# Patient Record
Sex: MALE | Race: WHITE | NOT HISPANIC OR LATINO | URBAN - METROPOLITAN AREA
[De-identification: names, ages, dates, MRNs, and addresses within clinical notes are randomized per-mention and may not be internally consistent; named-entity substitution may affect disease eponyms.]

---

## 2017-08-21 ENCOUNTER — COMMUNICATION - HEALTHEAST (OUTPATIENT)
Dept: FAMILY MEDICINE | Facility: CLINIC | Age: 47
End: 2017-08-21

## 2017-08-21 ENCOUNTER — COMMUNICATION - HEALTHEAST (OUTPATIENT)
Dept: TELEHEALTH | Facility: CLINIC | Age: 47
End: 2017-08-21

## 2017-08-21 ENCOUNTER — OFFICE VISIT - HEALTHEAST (OUTPATIENT)
Dept: FAMILY MEDICINE | Facility: CLINIC | Age: 47
End: 2017-08-21

## 2017-08-21 DIAGNOSIS — J02.0 PHARYNGITIS DUE TO STREPTOCOCCUS SPECIES: ICD-10-CM

## 2018-01-02 ENCOUNTER — COMMUNICATION - HEALTHEAST (OUTPATIENT)
Dept: FAMILY MEDICINE | Facility: CLINIC | Age: 48
End: 2018-01-02

## 2018-01-03 ENCOUNTER — OFFICE VISIT - HEALTHEAST (OUTPATIENT)
Dept: FAMILY MEDICINE | Facility: CLINIC | Age: 48
End: 2018-01-03

## 2018-01-03 ENCOUNTER — COMMUNICATION - HEALTHEAST (OUTPATIENT)
Dept: TELEHEALTH | Facility: CLINIC | Age: 48
End: 2018-01-03

## 2018-01-03 DIAGNOSIS — J32.9 BACTERIAL SINUSITIS: ICD-10-CM

## 2018-01-03 DIAGNOSIS — R09.81 SINUS CONGESTION: ICD-10-CM

## 2018-01-03 DIAGNOSIS — M79.644 PAIN OF FINGER OF RIGHT HAND: ICD-10-CM

## 2018-01-03 DIAGNOSIS — B96.89 BACTERIAL SINUSITIS: ICD-10-CM

## 2018-01-03 ASSESSMENT — MIFFLIN-ST. JEOR: SCORE: 1758.91

## 2018-06-25 ENCOUNTER — OFFICE VISIT - HEALTHEAST (OUTPATIENT)
Dept: FAMILY MEDICINE | Facility: CLINIC | Age: 48
End: 2018-06-25

## 2018-06-25 DIAGNOSIS — M54.50 LOW BACK PAIN: ICD-10-CM

## 2018-06-25 DIAGNOSIS — E55.9 VITAMIN D DEFICIENCY: ICD-10-CM

## 2018-06-25 LAB
ANION GAP SERPL CALCULATED.3IONS-SCNC: 9 MMOL/L (ref 5–18)
BUN SERPL-MCNC: 11 MG/DL (ref 8–22)
CALCIUM SERPL-MCNC: 9.4 MG/DL (ref 8.5–10.5)
CHLORIDE BLD-SCNC: 107 MMOL/L (ref 98–107)
CO2 SERPL-SCNC: 27 MMOL/L (ref 22–31)
CREAT SERPL-MCNC: 0.84 MG/DL (ref 0.7–1.3)
GFR SERPL CREATININE-BSD FRML MDRD: >60 ML/MIN/1.73M2
GLUCOSE BLD-MCNC: 91 MG/DL (ref 70–125)
POTASSIUM BLD-SCNC: 3.8 MMOL/L (ref 3.5–5)
SODIUM SERPL-SCNC: 143 MMOL/L (ref 136–145)

## 2018-06-25 ASSESSMENT — MIFFLIN-ST. JEOR: SCORE: 1781.59

## 2018-06-26 LAB — 25(OH)D3 SERPL-MCNC: 24.1 NG/ML (ref 30–80)

## 2018-12-05 ENCOUNTER — OFFICE VISIT - HEALTHEAST (OUTPATIENT)
Dept: FAMILY MEDICINE | Facility: CLINIC | Age: 48
End: 2018-12-05

## 2018-12-05 DIAGNOSIS — J01.00 ACUTE MAXILLARY SINUSITIS, RECURRENCE NOT SPECIFIED: ICD-10-CM

## 2019-10-02 ENCOUNTER — OFFICE VISIT - HEALTHEAST (OUTPATIENT)
Dept: FAMILY MEDICINE | Facility: CLINIC | Age: 49
End: 2019-10-02

## 2019-10-02 DIAGNOSIS — J01.00 ACUTE NON-RECURRENT MAXILLARY SINUSITIS: ICD-10-CM

## 2020-01-31 ENCOUNTER — COMMUNICATION - HEALTHEAST (OUTPATIENT)
Dept: SCHEDULING | Facility: CLINIC | Age: 50
End: 2020-01-31

## 2020-02-01 ENCOUNTER — OFFICE VISIT - HEALTHEAST (OUTPATIENT)
Dept: FAMILY MEDICINE | Facility: CLINIC | Age: 50
End: 2020-02-01

## 2020-02-01 DIAGNOSIS — J20.9 ACUTE BRONCHITIS, UNSPECIFIED ORGANISM: ICD-10-CM

## 2020-02-01 DIAGNOSIS — R06.2 WHEEZING: ICD-10-CM

## 2020-02-06 ENCOUNTER — OFFICE VISIT - HEALTHEAST (OUTPATIENT)
Dept: FAMILY MEDICINE | Facility: CLINIC | Age: 50
End: 2020-02-06

## 2020-02-06 ENCOUNTER — RECORDS - HEALTHEAST (OUTPATIENT)
Dept: GENERAL RADIOLOGY | Facility: CLINIC | Age: 50
End: 2020-02-06

## 2020-02-06 DIAGNOSIS — J18.9 COMMUNITY ACQUIRED PNEUMONIA, UNSPECIFIED LATERALITY: ICD-10-CM

## 2020-02-06 DIAGNOSIS — J06.9 UPPER RESPIRATORY TRACT INFECTION, UNSPECIFIED TYPE: ICD-10-CM

## 2020-02-06 DIAGNOSIS — J06.9 ACUTE UPPER RESPIRATORY INFECTION, UNSPECIFIED: ICD-10-CM

## 2020-02-13 ENCOUNTER — OFFICE VISIT - HEALTHEAST (OUTPATIENT)
Dept: FAMILY MEDICINE | Facility: CLINIC | Age: 50
End: 2020-02-13

## 2020-02-13 DIAGNOSIS — Z23 NEED FOR VACCINATION: ICD-10-CM

## 2020-02-13 DIAGNOSIS — R05.9 COUGH: ICD-10-CM

## 2020-02-13 LAB
FLUAV AG SPEC QL IA: NORMAL
FLUBV AG SPEC QL IA: NORMAL

## 2020-05-12 ENCOUNTER — COMMUNICATION - HEALTHEAST (OUTPATIENT)
Dept: TELEHEALTH | Facility: CLINIC | Age: 50
End: 2020-05-12

## 2020-05-12 ENCOUNTER — OFFICE VISIT - HEALTHEAST (OUTPATIENT)
Dept: FAMILY MEDICINE | Facility: CLINIC | Age: 50
End: 2020-05-12

## 2020-05-12 DIAGNOSIS — J98.01 BRONCHOSPASM: ICD-10-CM

## 2020-05-12 DIAGNOSIS — R06.2 WHEEZING: ICD-10-CM

## 2020-05-12 DIAGNOSIS — R06.09 DYSPNEA ON EXERTION: ICD-10-CM

## 2020-08-24 ENCOUNTER — OFFICE VISIT - HEALTHEAST (OUTPATIENT)
Dept: FAMILY MEDICINE | Facility: CLINIC | Age: 50
End: 2020-08-24

## 2020-08-24 DIAGNOSIS — J30.89 ENVIRONMENTAL AND SEASONAL ALLERGIES: ICD-10-CM

## 2020-08-24 DIAGNOSIS — R06.2 WHEEZING: ICD-10-CM

## 2020-08-24 DIAGNOSIS — J98.01 BRONCHOSPASM: ICD-10-CM

## 2020-09-10 ENCOUNTER — OFFICE VISIT - HEALTHEAST (OUTPATIENT)
Dept: FAMILY MEDICINE | Facility: CLINIC | Age: 50
End: 2020-09-10

## 2020-09-10 DIAGNOSIS — R35.0 BENIGN PROSTATIC HYPERPLASIA WITH URINARY FREQUENCY: ICD-10-CM

## 2020-09-10 DIAGNOSIS — E55.9 VITAMIN D DEFICIENCY: ICD-10-CM

## 2020-09-10 DIAGNOSIS — Z12.5 SCREENING FOR PROSTATE CANCER: ICD-10-CM

## 2020-09-10 DIAGNOSIS — Z13.220 LIPID SCREENING: ICD-10-CM

## 2020-09-10 DIAGNOSIS — Z23 NEEDS FLU SHOT: ICD-10-CM

## 2020-09-10 DIAGNOSIS — Z20.822 SUSPECTED COVID-19 VIRUS INFECTION: ICD-10-CM

## 2020-09-10 DIAGNOSIS — Z12.11 SCREEN FOR COLON CANCER: ICD-10-CM

## 2020-09-10 DIAGNOSIS — Z23 NEED FOR PROPHYLACTIC VACCINATION AGAINST DIPHTHERIA AND TETANUS: ICD-10-CM

## 2020-09-10 DIAGNOSIS — R05.9 COUGH: ICD-10-CM

## 2020-09-10 DIAGNOSIS — Z00.00 ANNUAL PHYSICAL EXAM: ICD-10-CM

## 2020-09-10 DIAGNOSIS — Z13.1 SCREENING FOR DIABETES MELLITUS: ICD-10-CM

## 2020-09-10 DIAGNOSIS — N40.1 BENIGN PROSTATIC HYPERPLASIA WITH URINARY FREQUENCY: ICD-10-CM

## 2020-09-10 LAB
ANION GAP SERPL CALCULATED.3IONS-SCNC: 10 MMOL/L (ref 5–18)
BUN SERPL-MCNC: 8 MG/DL (ref 8–22)
CALCIUM SERPL-MCNC: 9.4 MG/DL (ref 8.5–10.5)
CHLORIDE BLD-SCNC: 104 MMOL/L (ref 98–107)
CHOLEST SERPL-MCNC: 187 MG/DL
CO2 SERPL-SCNC: 26 MMOL/L (ref 22–31)
CREAT SERPL-MCNC: 0.7 MG/DL (ref 0.7–1.3)
FASTING STATUS PATIENT QL REPORTED: YES
GFR SERPL CREATININE-BSD FRML MDRD: >60 ML/MIN/1.73M2
GLUCOSE BLD-MCNC: 95 MG/DL (ref 70–125)
HDLC SERPL-MCNC: 47 MG/DL
LDLC SERPL CALC-MCNC: 108 MG/DL
POTASSIUM BLD-SCNC: 4.3 MMOL/L (ref 3.5–5)
PSA SERPL-MCNC: 1.6 NG/ML (ref 0–3.5)
SODIUM SERPL-SCNC: 140 MMOL/L (ref 136–145)
TRIGL SERPL-MCNC: 158 MG/DL

## 2020-09-10 ASSESSMENT — MIFFLIN-ST. JEOR: SCORE: 1650.05

## 2020-09-11 LAB
25(OH)D3 SERPL-MCNC: 32.6 NG/ML (ref 30–80)
25(OH)D3 SERPL-MCNC: 32.6 NG/ML (ref 30–80)

## 2020-09-12 ENCOUNTER — COMMUNICATION - HEALTHEAST (OUTPATIENT)
Dept: SCHEDULING | Facility: CLINIC | Age: 50
End: 2020-09-12

## 2020-09-21 ENCOUNTER — OFFICE VISIT - HEALTHEAST (OUTPATIENT)
Dept: ALLERGY | Facility: CLINIC | Age: 50
End: 2020-09-21

## 2020-09-21 DIAGNOSIS — J30.1 SEASONAL ALLERGIC RHINITIS DUE TO POLLEN: ICD-10-CM

## 2020-09-21 DIAGNOSIS — J45.20 MILD INTERMITTENT ASTHMA WITHOUT COMPLICATION: ICD-10-CM

## 2020-09-21 DIAGNOSIS — J30.89 ALLERGIC RHINITIS DUE TO AMERICAN HOUSE DUST MITE: ICD-10-CM

## 2020-09-21 RX ORDER — MONTELUKAST SODIUM 10 MG/1
10 TABLET ORAL AT BEDTIME
Qty: 30 TABLET | Refills: 1 | Status: SHIPPED | OUTPATIENT
Start: 2020-09-21 | End: 2022-11-17

## 2020-09-21 ASSESSMENT — MIFFLIN-ST. JEOR: SCORE: 1662.52

## 2020-10-13 ENCOUNTER — COMMUNICATION - HEALTHEAST (OUTPATIENT)
Dept: SCHEDULING | Facility: CLINIC | Age: 50
End: 2020-10-13

## 2020-10-24 ENCOUNTER — OFFICE VISIT - HEALTHEAST (OUTPATIENT)
Dept: FAMILY MEDICINE | Facility: CLINIC | Age: 50
End: 2020-10-24

## 2020-10-24 DIAGNOSIS — J01.10 ACUTE NON-RECURRENT FRONTAL SINUSITIS: ICD-10-CM

## 2020-11-12 ENCOUNTER — OFFICE VISIT - HEALTHEAST (OUTPATIENT)
Dept: FAMILY MEDICINE | Facility: CLINIC | Age: 50
End: 2020-11-12

## 2020-11-12 DIAGNOSIS — J98.01 BRONCHOSPASM: ICD-10-CM

## 2020-11-12 DIAGNOSIS — J32.9 CHRONIC SINUSITIS, UNSPECIFIED LOCATION: ICD-10-CM

## 2020-11-12 DIAGNOSIS — R06.2 WHEEZING: ICD-10-CM

## 2020-11-12 RX ORDER — ALBUTEROL SULFATE 90 UG/1
2 AEROSOL, METERED RESPIRATORY (INHALATION) EVERY 6 HOURS PRN
Qty: 1 EACH | Refills: 0 | Status: SHIPPED | OUTPATIENT
Start: 2020-11-12 | End: 2022-10-16

## 2021-02-18 ENCOUNTER — RECORDS - HEALTHEAST (OUTPATIENT)
Dept: ADMINISTRATIVE | Facility: OTHER | Age: 51
End: 2021-02-18

## 2021-03-13 ENCOUNTER — COMMUNICATION - HEALTHEAST (OUTPATIENT)
Dept: FAMILY MEDICINE | Facility: CLINIC | Age: 51
End: 2021-03-13

## 2021-05-28 ENCOUNTER — RECORDS - HEALTHEAST (OUTPATIENT)
Dept: ADMINISTRATIVE | Facility: CLINIC | Age: 51
End: 2021-05-28

## 2021-05-28 ASSESSMENT — ASTHMA QUESTIONNAIRES: ACT_TOTALSCORE: 11

## 2021-05-31 VITALS — BODY MASS INDEX: 28.31 KG/M2 | WEIGHT: 197.3 LBS

## 2021-05-31 VITALS — WEIGHT: 199 LBS | BODY MASS INDEX: 28.49 KG/M2 | HEIGHT: 70 IN

## 2021-06-01 VITALS — BODY MASS INDEX: 29.2 KG/M2 | HEIGHT: 70 IN | WEIGHT: 204 LBS

## 2021-06-01 NOTE — PROGRESS NOTES
Assessment:  1.  Acute left maxillary sinusitis.    Plan: Discussed options including symptomatic care, but together we decided to go ahead with azithromycin to 50 mg-#6-2 p.o. today then 1 p.o. on days 2 through 5.  Symptomatic care.   normal saline nose spray, Tylenol or ibuprofen as needed for pain, and call or return if not gradually improving and clearing over the next several weeks.    Subjective: 49-year-old male presenting for evaluation of illness of 2 weeks duration that started with a cold syndrome but then he notes that last Friday he developed difficulty with pain in the left cheek area, has been having some off-and-on postnasal drainage that is greenish or brownish, and he had more coughing over the weekend.  Has not really turned around.  No high fever.  No nausea or vomiting.  Patient Active Problem List   Diagnosis     Lumbar Radiculopathy     Vitamin D Deficiency     Low back pain     History reviewed. No pertinent past medical history.  Allergies   Allergen Reactions     Ciprofloxacin Unknown     Current Outpatient Medications on File Prior to Visit   Medication Sig Dispense Refill     IBUPROFEN (ADVIL ORAL) Take by mouth.       [DISCONTINUED] cyclobenzaprine (FLEXERIL) 10 MG tablet Take 1 tablet (10 mg total) by mouth 3 (three) times a day as needed for muscle spasms. 15 tablet 0     [DISCONTINUED] methylPREDNISolone (MEDROL DOSEPACK) 4 mg tablet Take 6 pills first day, 5 on 2nd day, etc tapering by one every day-Follow package directions 21 tablet 0     No current facility-administered medications on file prior to visit.      All other review of systems are currently negative.  He did feel like he might have had some wheezing over the weekend.    Objective:/84   Pulse 78   Temp 98.5  F (36.9  C)   Resp 16   Wt 194 lb 6 oz (88.2 kg)   SpO2 97%   BMI 27.89 kg/m    HEENT examination shows left cheek tenderness.  The external ears and TMs are normal.  There is nasal congestion.  Pharynx  is not remarkable.  Neck supple without adenopathy or thyromegaly.  Lungs clear.  Heart regular rate and rhythm without murmur.  No pedal edema.  He is alert with clear speech.

## 2021-06-02 VITALS — BODY MASS INDEX: 27.77 KG/M2 | WEIGHT: 193.56 LBS

## 2021-06-03 VITALS
TEMPERATURE: 98.5 F | RESPIRATION RATE: 16 BRPM | SYSTOLIC BLOOD PRESSURE: 124 MMHG | OXYGEN SATURATION: 97 % | WEIGHT: 194.38 LBS | DIASTOLIC BLOOD PRESSURE: 84 MMHG | HEART RATE: 78 BPM | BODY MASS INDEX: 27.89 KG/M2

## 2021-06-04 VITALS
TEMPERATURE: 98.5 F | WEIGHT: 186.56 LBS | OXYGEN SATURATION: 95 % | RESPIRATION RATE: 16 BRPM | HEART RATE: 100 BPM | SYSTOLIC BLOOD PRESSURE: 150 MMHG | BODY MASS INDEX: 26.77 KG/M2 | DIASTOLIC BLOOD PRESSURE: 100 MMHG

## 2021-06-04 VITALS
SYSTOLIC BLOOD PRESSURE: 138 MMHG | BODY MASS INDEX: 26.59 KG/M2 | WEIGHT: 185.3 LBS | HEART RATE: 86 BPM | TEMPERATURE: 98.2 F | OXYGEN SATURATION: 95 % | DIASTOLIC BLOOD PRESSURE: 90 MMHG

## 2021-06-04 VITALS
WEIGHT: 190 LBS | SYSTOLIC BLOOD PRESSURE: 132 MMHG | DIASTOLIC BLOOD PRESSURE: 91 MMHG | BODY MASS INDEX: 27.26 KG/M2 | RESPIRATION RATE: 20 BRPM | TEMPERATURE: 98.4 F | HEART RATE: 90 BPM | OXYGEN SATURATION: 93 %

## 2021-06-04 VITALS
HEART RATE: 86 BPM | WEIGHT: 182 LBS | SYSTOLIC BLOOD PRESSURE: 128 MMHG | BODY MASS INDEX: 27.58 KG/M2 | OXYGEN SATURATION: 96 % | DIASTOLIC BLOOD PRESSURE: 88 MMHG | HEIGHT: 68 IN

## 2021-06-05 VITALS — OXYGEN SATURATION: 99 % | BODY MASS INDEX: 27.11 KG/M2 | HEART RATE: 75 BPM | WEIGHT: 183 LBS | HEIGHT: 69 IN

## 2021-06-05 VITALS
OXYGEN SATURATION: 96 % | WEIGHT: 190 LBS | RESPIRATION RATE: 14 BRPM | HEART RATE: 115 BPM | TEMPERATURE: 98.5 F | SYSTOLIC BLOOD PRESSURE: 138 MMHG | DIASTOLIC BLOOD PRESSURE: 93 MMHG | BODY MASS INDEX: 28.47 KG/M2

## 2021-06-05 NOTE — PROGRESS NOTES
Chief Complaint   Patient presents with     Follow-up     Pt went to urgent care and dx with bronchitis. Pt c/o sob, tightness, cough, funny taste, wheezing when laying down.        HPI: Cough congestion and wheezing for 2 months in duration getting worse intensity.  Patient was seen in urgent care on 2/1/2020 and given albuterol nebulizer and discharged.  Since then continues to have wheezing and cough with mild sputum.    ROS: No vomiting diarrhea fever or rashes    SH: Reviewed-see Snapshot for review.     FH: Reviewed-see Snapshot for review.    Meds:  Benja has a current medication list which includes the following prescription(s): albuterol, ibuprofen, and azithromycin.    O:  BP (!) 150/100   Pulse 100   Temp 98.5  F (36.9  C)   Resp 16   Wt 186 lb 9 oz (84.6 kg)   SpO2 95%   BMI 26.77 kg/m    Constitutional:    --Vitals as above  --No acute distress  Eyes-  --Sclera noninjected  --Lids and conjunctiva normal  ENT-  --TMs clear  --Sclera noninjected  --Pharynx not erythematous  Neck-  --Neck supple    Lymph-  --No cervical lymphadenopathy  Lungs-  --wheezing left lower lobe  Heart-  --Regular rate and rhythm  Skin-  --Pink and dry    Chest x-ray-bony architecture is intact, costophrenic angles are sharp, there is focal infiltrates in the left lower lobe and cardiac silhouettes within normal limits    A/P:   1. Upper respiratory tract infection, unspecified type  - XR Chest 2 Views; Future    2. Community acquired pneumonia, unspecified laterality  Patient has community-acquired pneumonia.  Given the fact this is been going on for quite some time and the fact that he has a history of lumbar radiculopathy and continues to be wheezing will treat with Rocephin, Zithromax, increase fluids and rest and return if not improving  - cefTRIAXone injection 1 g (ROCEPHIN)  - azithromycin (ZITHROMAX Z-OLESYA) 250 MG tablet; 2 tabs today then 1 daily x 4 days  Dispense: 6 tablet; Refill: 0

## 2021-06-05 NOTE — TELEPHONE ENCOUNTER
"Pt reports \"shortness of breath, a little bit of phlegm and pain in center of chest with exercise\". Coughing \"a little bit\". Symptoms for \"about a week\". Had similar symptoms for about a month in December. \"A little wheezing and shortness of breath currently\". Possibly a fever over the weekend but not now.     Advised pt to be seen in clinic within four hours. Pt does not want to go to [facility].     Routing message to clinic to f/u with pt on next available appointment.     Reason for Disposition    Wheezing is present    Protocols used: COUGH - ACUTE [id number]      "

## 2021-06-06 NOTE — PROGRESS NOTES
Chief Complaint   Patient presents with     Follow Up     not feeling much better        HPI: 49-year-old male presents today for recheck.  Was seen last week and given Zithromax and Rocephin for perceived pneumonia.  His symptoms have improved somewhat but he has become more fatigued with increased myalgias.  No vomiting or diarrhea noted.  He notes that he took the Zithromax completely.  He has had mild fevers.    ROS: Positive for nonproductive cough.  Negative for rhinorrhea.  Negative for rashes.    SH: Reviewed-see Snapshot for review.     FH: Reviewed-see Snapshot for review.    Meds:  Benja has a current medication list which includes the following prescription(s): albuterol, azithromycin, ibuprofen, and codeine-guaifenesin.    O:  /90   Pulse 86   Temp 98.2  F (36.8  C)   Wt 185 lb 4.8 oz (84.1 kg)   SpO2 95%   BMI 26.59 kg/m    Constitutional:    --Vitals as above  --No acute distress but appears slightly ill  Eyes-  --Sclera noninjected  --Lids and conjunctiva normal  ENT-  --TMs clear  --Sclera noninjected  --Pharynx mildly erythematous  Neck-  --Neck supple    Lymph-  --No cervical lymphadenopathy  Lungs-  --Clear to Auscultation (better than last week)  Heart-  --Regular rate and rhythm  Skin-  --Pink and dry    Lab: Influenza negative    A/P:   1.  URI  This is a puzzling case.  Influenza negative but he has myalgias.  Cannot rule out that he is still having long-term influenza effects.  We gave him broad antibiotics last time so we will hold off on that and have him increase fluids and rest.  Lungs are clear so no indication for repeat x-ray.  - Influenza A/B Rapid Test- Nasal Swab      2.  Cough  He is having substantial cough which is inhibiting him from getting sleep.  Will treat with Robitussin with codeine and warned him about driving.  If not improving would consider further work-up or imaging.  - codeine-guaiFENesin (GUAIFENESIN AC)  mg/5 mL liquid; 2 TSP Q 6H PRN cough-advise  pt not to drive while taking medication.  Dispense: 120 mL; Refill: 0

## 2021-06-08 NOTE — PROGRESS NOTES
SUBJECTIVE: Benja Rosas is a 49 y.o. White or  male who presents today with a complaint of cough and previously shortness of breath which is not as evident today.  I gowned up with PPE to see this patient.  He does have wheezing.  He says he was at the [place] on January 16 and got sick afterward.  He was on the Internet and thought perhaps he had legionnaires disease so he decided to come in to be seen in clinic.  On 2/1 he went to the walk-in clinic and they heard him wheeze and started him on albuterol.  He saw Dr. Brandon on 2/6 and he was diagnosed with pneumonia and was given a azithromycin.  He had a chest x-ray which was read as normal.  He did not get better with the Z-Julio César he was given and so he came in again 2/13 and was told he has double pneumonia and that it went from one side to the other.  He was ruled out for influenza and given cough syrup with codeine.   He was sick for 6 weeks total.  He did get better but then again seemed to get worse and on Saturday.  He went to [place] where apparently he went through a swabbing for COVID-19 process in a drive-by fashion.  He was told that he was negative, but we cannot find evidence of the swab in care everywhere.  He denies any fever.  He does tell me he thinks he might be allergic to his cat and possibly trees.  He has a somewhat productive cough that is pretty minimal right now.  I questioned him about asthma.  He does have allergies.  He tells me his mom and sister have asthma.  He never thought he did before.  We discussed having what ever it was he had in January might have started him with some reactive airway disease.  He notes that his breathing and cough is worse at night.  He tells me he has used Zyrtec and that has helped a bit as well as Xyzal.  We discussed making sure that he stays away from Sudafed products as it is possibly making his blood pressure elevated.  Today it is 138/90.    OBJECTIVE: There were no vitals  "taken for this visit.  Nursing refused to room this patient and take vitals as \"we are not to waste PPE\".  General: Relatively well-appearing middle-aged gentleman in no acute distress  HEENT: Eyes clear, nose without rhinorrhea  Heart: Regular rate and rhythm without murmur  Lungs: Intermittent very mild wheeze but no crackles.  Very mild and intermittent cough(twice during our whole visit)  Abdomen: Soft    ASSESSMENT & PLAN:     1. Dyspnea on exertion  Patient does complain of this although he is breathing comfortably when he tells a story.  There is no tachypnea.    2. Bronchospasm  I believe this patient either has asthma or reactive airway disease and that we could probably knocked out any bronchospasm/wheezing with a taper of prednisone and this might make him feel better as well.  I will refill his inhaler.  - predniSONE (DELTASONE) 10 mg tablet; Take 3 tabs daily for 4 days, then 2 tabs daily for 4 days, then 1 tabs daily for 4 days, then half tab daily for 4 days, then stop..  Dispense: 26 tablet; Refill: 0  - albuterol (PROAIR HFA;PROVENTIL HFA;VENTOLIN HFA) 90 mcg/actuation inhaler; Inhale 2 puffs every 6 (six) hours as needed for wheezing.  Dispense: 1 each; Refill: 0    3. Wheezing  - albuterol (PROAIR HFA;PROVENTIL HFA;VENTOLIN HFA) 90 mcg/actuation inhaler; Inhale 2 puffs every 6 (six) hours as needed for wheezing.  Dispense: 1 each; Refill: 0    I suspect he does not have COVID and likely did not.  I am unsure which type of COVID testing he had.  If it was PCR looking for RNA and active virus then it is not surprising that his testing was negative.  He should perhaps pursue antibody testing if indeed the COVID testing was PCR.  At this point I will not do that as we do not know the nature of his previous test.  The important thing is to treat his symptoms and get him feeling better.  He could consider coming back for a lab only COVID antibody test when he finds out the true nature of the previous " testing.    Patient Active Problem List   Diagnosis     Lumbar Radiculopathy     Vitamin D Deficiency     Low back pain       Current Outpatient Medications on File Prior to Visit   Medication Sig Dispense Refill     codeine-guaiFENesin (GUAIFENESIN AC)  mg/5 mL liquid 2 TSP Q 6H PRN cough-advise pt not to drive while taking medication. 120 mL 0     IBUPROFEN (ADVIL ORAL) Take by mouth.       No current facility-administered medications on file prior to visit.

## 2021-06-10 NOTE — PROGRESS NOTES
"Benja Rosas is a 49 y.o. male who is being evaluated via a billable video visit.      The patient has been notified of following:     \"This video visit will be conducted via a call between you and your physician/provider. We have found that certain health care needs can be provided without the need for an in-person physical exam.  This service lets us provide the care you need with a video conversation.  If a prescription is necessary we can send it directly to your pharmacy.  If lab work is needed we can place an order for that and you can then stop by our lab to have the test done at a later time.    Video visits are billed at different rates depending on your insurance coverage. Please reach out to your insurance provider with any questions.    If during the course of the call the physician/provider feels a video visit is not appropriate, you will not be charged for this service.\"    Patient has given verbal consent to a Video visit? Yes  How would you like to obtain your AVS? AVS Preference: Mail a copy.  If dropped by the video visit, the video invitation should be sent to: Text to cell phone:  Will anyone else be joining your video visit? No        Video Start Time: 11:42 AM    SUBJECTIVE: Benja Rosas is a 49 y.o. White or  male who presents today by video while he is in his car going to a meeting in [place].  He has some pretty significant allergies which are seasonal.  I saw him last on May 12 when he was having similar symptoms to what he is having now.  He has been wheezing and having a productive cough as well as having stuffy nose and runny nose.  He knows he is allergic to ragweed.  He was having asthma-like symptoms and so I had given him a prednisone taper.  He said that worked like a charm.  He was supposed to follow-up with Dr. Becerra who is his primary, but Dr. Becerra has now retired.  He never got the chance to follow-up with him.  We discussed the fact that he was exceptionally " sick in February.  He was treated with antibiotic more than once and he was told he had pneumonia although I do not see any evidence from x-ray that he did.  Looking back he thinks perhaps he had COVID.  He was tested for COVID but only with swabs.  We discussed that by the time they swabbed him he may have cleared the virus but was still having symptoms because it had triggered some asthma.  Then when he got allergies it triggered his asthma again.  Now it is happening a third time.  He says he even has some pain in his left lower lobe when he breathes deeply.  He has been using Zyrtec and nasal spray as well as Sudafed even though he knows he should not do that because of his blood pressure.  It was rather elevated when he saw me in May.  He has not had any blood work done in 2 years.  We discussed he really needs to get in for a full physical to establish with another provider.  I suggested [facility].  I also suggested that he be officially seen and assessed by Lesley Traylor for his allergies and for a definitive diagnosis of asthma.  He has strong family history for asthma.  We discussed that it can crop up at any time.  He is willing to do this.  I am willing to send a prednisone taper and an albuterol inhaler for him to get him through.    OBJECTIVE: There were no vitals taken for this visit.  General: Relatively healthy appearing middle-aged gentleman in no acute physical distress or respiratory distress currently  HEENT: Extraocular movements and facial movements are intact.  I do not notice rhinorrhea.  Lungs: Patient is speaking and breathing comfortably and I do not notice a wheeze or cough during our visit  Extremities: Patient is in his car in [place].  Is moving all 4 extremities during our visit.      ASSESSMENT & PLAN:     1. Environmental and seasonal allergies  He should continue with the Zyrtec and nasal spray.    2. Bronchospasm  This is either asthma or vocal cord dysfunction.  This needs to be  assessed and so I am referring him to see Dr. Traylor at the allergy clinic.  Hopefully she can decipher if this is asthma.  His previous prednisone taper relieved his symptoms quickly.  - Ambulatory referral to Allergy  - predniSONE (DELTASONE) 10 mg tablet; Take 3 tabs daily for 4 days, then 2 tabs daily for 4 days, then 1 tabs daily for 4 days, then half tab daily for 4 days, then stop..  Dispense: 26 tablet; Refill: 0  - albuterol (PROAIR HFA;PROVENTIL HFA;VENTOLIN HFA) 90 mcg/actuation inhaler; Inhale 2 puffs every 6 (six) hours as needed for wheezing.  Dispense: 1 each; Refill: 0    3. Wheezing  - Ambulatory referral to Allergy  - albuterol (PROAIR HFA;PROVENTIL HFA;VENTOLIN HFA) 90 mcg/actuation inhaler; Inhale 2 puffs every 6 (six) hours as needed for wheezing.  Dispense: 1 each; Refill: 0    We discussed that he needs to establish with a new provider as Dr. Becerra has retired.  He wants to get antibody tested for COVID which I think is reasonable.  He also had needs lab work since he has not had any done in over 2 years.  He is behind on his preventative measures as well.  I have referred him to [facility] and will have the nurse set up a physical exam/establish with [facility] in about a month.    Patient Active Problem List   Diagnosis     Lumbar Radiculopathy     Vitamin D Deficiency     Low back pain     Environmental and seasonal allergies     Bronchospasm       Current Outpatient Medications on File Prior to Visit   Medication Sig Dispense Refill     [DISCONTINUED] albuterol (PROAIR HFA;PROVENTIL HFA;VENTOLIN HFA) 90 mcg/actuation inhaler Inhale 2 puffs every 6 (six) hours as needed for wheezing. 1 each 0     IBUPROFEN (ADVIL ORAL) Take by mouth.       [DISCONTINUED] codeine-guaiFENesin (GUAIFENESIN AC)  mg/5 mL liquid 2 TSP Q 6H PRN cough-advise pt not to drive while taking medication. 120 mL 0     [DISCONTINUED] predniSONE (DELTASONE) 10 mg tablet Take 3 tabs daily for 4 days, then 2 tabs daily for 4  days, then 1 tabs daily for 4 days, then half tab daily for 4 days, then stop.. 26 tablet 0     No current facility-administered medications on file prior to visit.          Video-Visit Details    Type of service:  Video Visit    Video End Time (time video stopped): 12:16 PM  Originating Location (pt. Location): In [place]    Distant Location (provider location):  [facility]     Platform used for Video Visit: Yessi Veloz MD (Betsy)

## 2021-06-11 NOTE — PATIENT INSTRUCTIONS - HE
Dust mite control    Wash bedding weekly in hot water, covers, keep humidity <50%    Montelukast during spring, summer and fall    Try for 1 month    Albuterol goal use less than 2 times per week    Otherwise, quick to add daily inhaler    Could consider imaging if symptoms return

## 2021-06-11 NOTE — PATIENT INSTRUCTIONS - HE
"That shingles vaccine we talked about is called \"Shingrix\". Check with your insurance to see if they cover it. If they do and you're interested in getting it, let me know and we can have you come in for just the shot without having to see me.    The lung issues sound more like a reactive airway disease or asthma possibly triggered by allergies.  We have ways to keep this under control, but lets see what Dr. Traylor says and go from there.    We are updating your influenza for the season and your tetanus which is good for the next 10 years.    You should be getting a call about that colonoscopy.  Let me know if you do not hear anything in the next couple weeks.    If the nighttime urination issues persist or worsen, let me know and we can always try that tamsulosin.  Try to cut out fluids after dinner.  This may help.  "

## 2021-06-11 NOTE — PROGRESS NOTES
Assessment:      Healthy male exam.      Plan:      1. Annual physical exam     2. Cough     3. Suspected COVID-19 virus infection  COVID-19 Virus (Coronavirus) Antibody & Titer Reflex   4. Screen for colon cancer  Ambulatory referral for Colonoscopy   5. Need for prophylactic vaccination against diphtheria and tetanus  Td, Adult, PF   6. Needs flu shot  Influenza, Recombinant, Inj, Quadrivalent, PF, 18+YRS   7. Vitamin D deficiency  Vitamin D, Total (25-Hydroxy)   8. Screening for diabetes mellitus  Basic Metabolic Panel   9. Lipid screening  Lipid [place] FASTING   10. Screening for prostate cancer  PSA, Annual Screen (Prostatic-Specific Antigen)   11. Benign prostatic hyperplasia with urinary frequency       Overall patient is doing better with his cough with the second prednisone regimen this summer.  Discussed with patient I suspect more of a reactive airway disease/asthma instead of recurrent pneumonia.  Keep appointment with Dr. Traylor.  Check COVID antibodies.  Update flu vaccine.  Screening colonoscopy and labs ordered.  Discussed with patient he likely has BPH.  He would like to hold off on tamsulosin for now.  Subjective:      Benja Rosas is a 49 y.o. male who presents for an annual exam. The patient reports that there is not domestic violence in his life.     Overall patient is doing okay.  Due for colon cancer screening.  Twice this year he has had episodes of significant nasal congestion along with chest tightness and copious mucus production.  Earlier this year he had a coughing fit that would last weeks at a time.  X-ray imaging was done which was clear, but he was diagnosed with pneumonia and treated with antibiotics.  Did not respond to this.  He seemed to respond to prednisone both in February and August of this year.  Dr. Veloz mentioned possible allergies versus reactive airway disease/asthma.  Referral to Dr. Traylor placed which is coming up in a couple weeks.  Systemically patient is doing much  better with his second prednisone taper.  No formal PFTs.    Patient notices some worsening nocturia.  Does not limit fluids before bed.  Sometimes is harder to achieve a good flow.  Known history of vitamin D deficiency.      Takes supplementation 1-2 times a week.    Healthy Habits:   Regular Exercise: No  Sunscreen Use: No  Healthy Diet: No  Dental Visits Regularly: Yes  Seat Belt: Yes  Sexually active: Yes  Monthly Self Testicular Exams:  No  Hemoccults: No  Flex Sig: No  Colonoscopy: Yes and 2010.  Lipid Profile: No  Glucose Screen: No  Prevention of Osteoporosis: Yes  Last Dexa: No  Guns at Home:  Yes  Gun safe/class:  Yes      Immunization History   Administered Date(s) Administered     DT (pediatric) 09/23/1998     INFLUENZA,SEASONAL QUAD, PF, =/> 6months 10/14/2015     Influenza Whole 09/28/2011     Influenza,seasonal quad, PF, 6-35MOS 10/05/2017     Td,adult,historic,unspecified 12/30/2009     Tdap 12/30/2009     Immunization status: up to date and documented, due today.    No exam data present    Current Outpatient Medications   Medication Sig Dispense Refill     ascorbic acid (VITAMIN C ORAL) Take by mouth.       cholecalciferol, vitamin D3, (VITAMIN D3 ORAL) Take 2,000 Units by mouth daily.       MEN'S MULTI-VITAMIN ORAL Take by mouth.       albuterol (PROAIR HFA;PROVENTIL HFA;VENTOLIN HFA) 90 mcg/actuation inhaler Inhale 2 puffs every 6 (six) hours as needed for wheezing. 1 each 0     IBUPROFEN (ADVIL ORAL) Take by mouth.       predniSONE (DELTASONE) 10 mg tablet Take 3 tabs daily for 4 days, then 2 tabs daily for 4 days, then 1 tabs daily for 4 days, then half tab daily for 4 days, then stop.. 26 tablet 0     No current facility-administered medications for this visit.      No past medical history on file.  No past surgical history on file.  Ciprofloxacin  No family history on file.  Social History     Socioeconomic History     Marital status: Single     Spouse name: Not on file     Number of  "children: Not on file     Years of education: Not on file     Highest education level: Not on file   Occupational History     Not on file   Social Needs     Financial resource strain: Not on file     Food insecurity     Worry: Not on file     Inability: Not on file     Transportation needs     Medical: Not on file     Non-medical: Not on file   Tobacco Use     Smoking status: Never Smoker     Smokeless tobacco: Never Used   Substance and Sexual Activity     Alcohol use: Yes     Comment: 2 per month     Drug use: No     Sexual activity: Not on file   Lifestyle     Physical activity     Days per week: Not on file     Minutes per session: Not on file     Stress: Not on file   Relationships     Social connections     Talks on phone: Not on file     Gets together: Not on file     Attends [employer]an service: Not on file     Active member of club or organization: Not on file     Attends meetings of clubs or organizations: Not on file     Relationship status: Not on file     Intimate partner violence     Fear of current or ex partner: Not on file     Emotionally abused: Not on file     Physically abused: Not on file     Forced sexual activity: Not on file   Other Topics Concern     Not on file   Social History Narrative     Not on file       Review of Systems  General:  Denies problem  Eyes: Denies problem  Ears/Nose/Throat: Denies problem  Cardiovascular: Denies problem  Respiratory:  Denies problem  Gastrointestinal:  Denies problem  Genitourinary: Denies problem  Musculoskeletal:  Denies problem  Skin: Denies problem  Neurologic: Denies problem  Psychiatric: Denies problem  Endocrine: Denies problem  Heme/Lymphatic: Denies problem   Allergic/Immunologic: Denies problem        Objective:     Vitals:    09/10/20 0845   BP: 134/90   Pulse: 86   SpO2: 96%   Weight: 182 lb (82.6 kg)   Height: 5' 8\" (1.727 m)     Body mass index is 27.67 kg/m .    Physical  General Appearance: Alert, cooperative, no distress, appears stated " age  Head: Normocephalic, without obvious abnormality, atraumatic  Eyes: PERRL, conjunctiva/corneas clear, EOM's intact  Ears: Normal TM's and external ear canals, both ears  Nose: Nares normal, septum midline,mucosa normal, no drainage  Throat: Lips, mucosa, and tongue normal; teeth and gums normal  Neck: Supple, symmetrical, trachea midline, no adenopathy;  thyroid: not enlarged, symmetric, no tenderness/mass/nodules; no carotid bruit or JVD  Back: Symmetric, no curvature, ROM normal, no CVA tenderness  Lungs: Clear to auscultation bilaterally, respirations unlabored  Heart: Regular rate and rhythm, S1 and S2 normal, no murmur, rub, or gallop,  Abdomen: Soft, non-tender, bowel sounds active all four quadrants,  no masses, no organomegaly  Genitourinary: Deferred   Musculoskeletal: Normal range of motion. No joint swelling or deformity.   Extremities: Extremities normal, atraumatic, no cyanosis or edema  Skin: Skin color, texture, turgor normal, no rashes or lesions  Lymph nodes: Cervical, supraclavicular, and axillary nodes normal  Neurologic: He is alert. He has normal reflexes.   Psychiatric: He has a normal mood and affect.      Abelardo Veliz, CNP

## 2021-06-11 NOTE — PROGRESS NOTES
Chief complaint: Allergies    History of present illness: This is a pleasant 49-year-old gentleman I was asked to see for evaluation by Dr. Veloz in regards to allergies.  Patient states in February this year he was very sick with what was thought to be pneumonia.  Chest x-ray at that time was negative.  He states that that then in May became sick again.  He had a significant cough.  The cough will wake him up at night.  He coughs for about 2 hours at a time.  He becomes somewhat breathless with this.  He was given prednisone and albuterol inhaler and symptoms quickly resolved.  This happened again in August.  He states again he required prednisone and albuterol inhaler.  He reports albuterol here did help and temporarily relieves symptoms.  He has noted a runny nose and congestion he states when his head becomes very plugged up it seems to go right to his chest.  He is never been previously diagnosed with allergies or asthma.  He does have a strong family history of this and suspects he may have allergies, however.  He has not used any allergy medication regularly.  He did try Zyrtec and Flonase during the second bout in May, however, it did not seem to help.    Past medical history: Otherwise unremarkable    Social history: He is an , has a cat at home, no recent changes at home, lives at home with central air basement, no exposure to mold, non-smoker, non-smoking environment    Family history: Mother and sister with allergies and asthma    Review of Systems performed as above and the remainder is negative.       Current Outpatient Medications:      ascorbic acid (VITAMIN C ORAL), Take by mouth., Disp: , Rfl:      cholecalciferol, vitamin D3, (VITAMIN D3 ORAL), Take 2,000 Units by mouth daily., Disp: , Rfl:      MEN'S MULTI-VITAMIN ORAL, Take by mouth., Disp: , Rfl:      albuterol (PROAIR HFA;PROVENTIL HFA;VENTOLIN HFA) 90 mcg/actuation inhaler, Inhale 2 puffs every 6 (six) hours as needed for  "wheezing., Disp: 1 each, Rfl: 0     montelukast (SINGULAIR) 10 mg tablet, Take 1 tablet (10 mg total) by mouth at bedtime., Disp: 30 tablet, Rfl: 1  Allergies   Allergen Reactions     Ciprofloxacin Unknown       Pulse 75   Ht 5' 8.5\" (1.74 m)   Wt 183 lb (83 kg)   SpO2 99%   BMI 27.42 kg/m    Gen: Pleasant male not in acute distress  HEENT: Eyes no erythema of the bulbar or palpebral conjunctiva, no edema. Ears: TMs well visualized, no effusions. Nose: No congestion, mucosa normal. Mouth: Throat clear, no lip or tongue edema.   Cardiac: Regular rate and rhythm, no murmurs, rubs or gallops  Respiratory: Clear to auscultation bilaterally, no adventitious breath sounds  Lymph: No supraclavicular or cervical lymphadenopathy  Skin: No rashes or lesions  Psych: Alert and oriented times 3    Last Percutaneous Allergy Test Results  Trees  [place], White  1:20 H  (W/F in mm): 0-0 (09/21/20 1118)  Birch Mix 1:20 H (W/F in mm): 5-15 (09/21/20 1118)  [place], Common 1:20 H (W/F in mm): 0-0 (09/21/20 1118)  Elm, American 1:20 H (W/F in mm): 0-0 (09/21/20 1118)  [place], Shagbark 1:20 H (W/F in mm): 0-0 (09/21/20 1118)  Maple, Hard/Sugar 1:20 H (W/F in mm): 0-0 (09/21/20 1118)  [place] Mix 1:20 H (W/F in mm): 0-0 (09/21/20 1118)  [place], Red 1:20 H (W/F in mm): 0-0 (09/21/20 1118)  Saxon, American 1:20 H (W/F in mm): 0-0 (09/21/20 1118)  [place] Tree 1:20 H (W/F in mm): 0-0 (09/21/20 1118)  Dust Mites  D. Pteronyssinus Mite 30,000 AU/ML H (W/F in mm): 5-20 (09/21/20 1118)  D. Farinae Mite 30,000 AU/ML H (W/F in mm: 5-15 (09/21/20 1118)  Grasses  Grass Mix #4 10,000 BAU/ML H: 5-15 (09/21/20 1118)  Lance Grass 1:20 H (W/F in mm): 4-15 (09/21/20 1118)  Cockroach  Cockroach Mix 1:10 H (W/F in mm): 0-0 (09/21/20 1118)  Molds/Fungi  Alternaria Tenuis 1:10 H (W/F in mm): 0-0 (09/21/20 1118)  Aspergillus Fumigatus 1:10 H (W/F in mm): 0-0 (09/21/20 1118)  Homodendrum Cladosporioides 1:10 H (W/F in mm): 0-0 (09/21/20 1118)  Penicillin " Notatum 1:10 H (W/F in mm): 0-0 (09/21/20 1118)  Epicoccum 1:10 H (W/F in mm): 0-0 (09/21/20 1118)  Weeds  Ragweed, Short 1:20 H (W/F in mm): 6-22 (09/21/20 1118)  Dock, Sorrel 1:20 H (W/F in mm): 0-0 (09/21/20 1118)  Lamb's Quarter 1:20 H (W/F in mm): 0-0 (09/21/20 1118)  Pigweed, Rough Red Root 1:20 H  (W/F in mm): 0-0 (09/21/20 1118)  Plantain, English 1:20 H  (W/F in mm): 0-0 (09/21/20 1118)  Sagebrush, Mugwort 1:20 H  (W/F in mm): 0-0 (09/21/20 1118)  Animal  Cat 10,000 BAU/ML H (W/F in mm): 0-0 (09/21/20 1118)  AP Dog Hair/Dander 1:100: 0-0 (09/21/20 1118)  Controls  Device Type: QUINTIP (09/21/20 1118)  Neg. control: 50% Glycerine/Saline H (W/F in mm): 0-0 (09/21/20 1118)  Pos. control: Histamine 6mg/ML (W/F in mms): 5-10 (09/21/20 1118)      Impression report and plan:  1.  Allergic rhinitis  2.  Intermittent asthma    Recommended a trial of montelukast.  Cautioned him to the rare side effect of mood disturbance.  If he notes breakthrough symptoms, I would be quick to add a controller medication.  Could consider reimaging of the chest as well.  He may be able to wean off this medication during the winter but should restart this prior to April 1.  He should use this during spring, summer and fall.  Reviewed dust mite control.

## 2021-06-12 NOTE — PATIENT INSTRUCTIONS - HE
You were seen today for a sinus infection.    Symptoms management:  - May use Tylenol or Ibuprofen for discomfort and/or fever if present  - May try saline irrigation to relieve congestion (see instructions below)  - Use of nasal steroids (Flonase) as prescribed  - Take antibiotics as prescribed for the full course (even if symptoms have improved)  - If you are experiencing ear fullness, may try an oral decongestant such as sudafed    Reasons to come back for re-evaluation:  - Develop a fever of 100.4F or current fever worsens  - Troubles seeing or double vision  - Swelling or redness around one or both eyes  - Sfiff neck  - Symptoms are not improving over the next 5 days    Buffered normal saline nasal irrigation   The benefits   1. Saline (saltwater) washes the mucus and irritants from your nose.   2. The sinus passages are moisturized.   3. Studies have also shown that a nasal irrigation improves cell function (the cells that move the mucus work better).   The recipe   Use a one-quart glass jar that is thoroughly cleansed.   You may use a large medical syringe (30 cc), water pick with an irrigation tip (preferred method), squeeze bottle, or Neti pot. Do not use a baby bulb syringe. The syringe or pick should be sterilized frequently or replaced every two to three weeks to avoid contamination and infection.   Fill with water that has been distilled, previously boiled, or otherwise sterilized. Plain tap water is not recommended, because it is not necessarily sterile.   Add 1 to 1  heaping teaspoons of pickling/bro salt. Do not use table salt, because it contains a large number of additives.   Add 1 teaspoon of baking soda (pure bicarbonate).   Mix ingredients together, and store at room temperature. Discard after one week.   You may also make up a solution from premixed packets that are commercially prepared specifically for nasal irrigation.   The instructions   Irrigate your nose with saline one to two times  per day.   If you have been told to use nasal medication, you should always use your saline solution first. The nasal medication is much more effective when sprayed onto clean nasal membranes, and the spray will reach deeper into the nose.   Pour the amount of fluid you plan to use into a clean bowl. Do not put your used syringe back into the storage container, because it contaminates your solution.   You may warm the solution slightly in the microwave, but be sure that the solution is not hot.   Bend over the sink (some people do this in the shower) and squirt the solution into each side of your nose, aiming the stream toward the back of your head, not the top of your head. The solution should flow into one nostril and out of the other, but it will not harm you if you swallow a little.   Some people experience a little burning sensation the first few times they use buffered saline solution, but this usually goes away after they adapt to it.

## 2021-06-12 NOTE — PROGRESS NOTES
S:   CC: Cold x 1 wk, with mild pain located in the pharynx (left greater than right) off and on for 3 days duration associated with dizziness.    ROS: + fever, PND,  -N/V/D.  Taking pseudofed.  Past history notable for diverticulitis.  Socially does not smoke and he works in the solar panel industry.  He reports past history of diverticulitis which is currently stable.    Meds:  Pseudofed, advil    O:  /82 (Patient Site: Right Arm, Patient Position: Sitting, Cuff Size: Adult Large)  Pulse 96  Temp 98.8  F (37.1  C) (Oral)   Resp 20  Wt 197 lb 4.8 oz (89.5 kg)  BMI 28.31 kg/m2    Lungs--Clear to Auscultation  Heart--Regular rate and rhythm  Abdomen--Soft, non-tender, non-distended  Skin-Pink and dry   ENT-pharynx is 3+1 bilaterally, erythematous with purulence bilaterally  Neck-mild cervical lymphadenopathy is painful on the left    A:   Pharyngitis-strep-new problem  Diverticulitis-stable    P:   Augmentin 875 twice daily 10 days  Increase fluids and rest  Ibuprofen for pain  Return if not improving.

## 2021-06-12 NOTE — PROGRESS NOTES
Assessment & Plan:       1. Acute non-recurrent frontal sinusitis  doxycycline (VIBRA-TABS) 100 MG tablet        Medical Decision Making  Patient presents with ongoing sinus congestion for 2-1/2 weeks.  His symptoms are concerning for bacterial sinusitis given tenderness to percussion over the sinuses and facial pressure.  Will treat patient with oral antibiotics.  Recommend discontinuing ibuprofen and Sudafed use due to increased heart rate and increased blood pressure.  Recommend trial of nasal steroid.  Continue with plenty of clear fluids and use of humidifiers.  Discussed signs of worsening symptoms and when to follow-up with PCP if no symptom improvement.      Patient Instructions   You were seen today for a sinus infection.    Symptoms management:  - May use Tylenol or Ibuprofen for discomfort and/or fever if present  - May try saline irrigation to relieve congestion (see instructions below)  - Use of nasal steroids (Flonase) as prescribed  - Take antibiotics as prescribed for the full course (even if symptoms have improved)  - If you are experiencing ear fullness, may try an oral decongestant such as sudafed    Reasons to come back for re-evaluation:  - Develop a fever of 100.4F or current fever worsens  - Troubles seeing or double vision  - Swelling or redness around one or both eyes  - Sfiff neck  - Symptoms are not improving over the next 5 days    Buffered normal saline nasal irrigation   The benefits   1. Saline (saltwater) washes the mucus and irritants from your nose.   2. The sinus passages are moisturized.   3. Studies have also shown that a nasal irrigation improves cell function (the cells that move the mucus work better).   The recipe   Use a one-quart glass jar that is thoroughly cleansed.   You may use a large medical syringe (30 cc), water pick with an irrigation tip (preferred method), squeeze bottle, or Neti pot. Do not use a baby bulb syringe. The syringe or pick should be sterilized  frequently or replaced every two to three weeks to avoid contamination and infection.   Fill with water that has been distilled, previously boiled, or otherwise sterilized. Plain tap water is not recommended, because it is not necessarily sterile.   Add 1 to 1  heaping teaspoons of pickling/bro salt. Do not use table salt, because it contains a large number of additives.   Add 1 teaspoon of baking soda (pure bicarbonate).   Mix ingredients together, and store at room temperature. Discard after one week.   You may also make up a solution from premixed packets that are commercially prepared specifically for nasal irrigation.   The instructions   Irrigate your nose with saline one to two times per day.   If you have been told to use nasal medication, you should always use your saline solution first. The nasal medication is much more effective when sprayed onto clean nasal membranes, and the spray will reach deeper into the nose.   Pour the amount of fluid you plan to use into a clean bowl. Do not put your used syringe back into the storage container, because it contaminates your solution.   You may warm the solution slightly in the microwave, but be sure that the solution is not hot.   Bend over the sink (some people do this in the shower) and squirt the solution into each side of your nose, aiming the stream toward the back of your head, not the top of your head. The solution should flow into one nostril and out of the other, but it will not harm you if you swallow a little.   Some people experience a little burning sensation the first few times they use buffered saline solution, but this usually goes away after they adapt to it.             Subjective:       Benja Rosas is a 50 y.o. male here for evaluation of sinus congestion.  Onset of symptoms was 2 and half weeks ago with worsening symptoms since then.  Patient states that he came down with like symptoms.  Initially had rhinorrhea, cough, and fevers.  The  "fevers improved.  Then over the last week his congestion and drainage has thickened and turned to green/yellow mucus.  Patient notes worsening pressure on the left side of his face in his left ear.  He denies any new fevers.  No shortness of breath or chest pains.  Patient does have a history of bacterial sinus infections that occur about once a year.  Patient has been using ibuprofen and Sudafed regularly with some temporary relief.    The following portions of the patient's history were reviewed and updated as appropriate: allergies, current medications and problem list.    Review of Systems  Pertinent items are noted in HPI.     Allergies  Allergies   Allergen Reactions     Ciprofloxacin Unknown       Family History   Problem Relation Age of Onset     Heart failure Mother      Asthma Mother      Lung cancer Father      Other Sister         \"numerous health issues\"     No Medical Problems Brother        Social History     Socioeconomic History     Marital status: Single     Spouse name: None     Number of children: None     Years of education: None     Highest education level: None   Occupational History     None   Social Needs     Financial resource strain: None     Food insecurity     Worry: None     Inability: None     Transportation needs     Medical: None     Non-medical: None   Tobacco Use     Smoking status: Never Smoker     Smokeless tobacco: Never Used   Substance and Sexual Activity     Alcohol use: Yes     Frequency: 2-4 times a month     Drug use: No     Sexual activity: None   Lifestyle     Physical activity     Days per week: None     Minutes per session: None     Stress: None   Relationships     Social connections     Talks on phone: None     Gets together: None     Attends Holiness service: None     Active member of club or organization: None     Attends meetings of clubs or organizations: None     Relationship status: None     Intimate partner violence     Fear of current or ex partner: None     " Emotionally abused: None     Physically abused: None     Forced sexual activity: None   Other Topics Concern     None   Social History Narrative     None         Objective:       BP (!) 138/93 (Patient Site: Right Arm, Patient Position: Sitting, Cuff Size: Adult Regular)   Pulse (!) 115   Temp 98.5  F (36.9  C)   Resp 14   Wt 190 lb (86.2 kg)   SpO2 96%   BMI 28.47 kg/m    General appearance: alert, appears stated age, cooperative, no distress and non-toxic  Head: Normocephalic, without obvious abnormality, atraumatic, sinuses tender to percussion  Ears: TMs intact with thick mucoid fluid present, no bulging, no erythema  Nose: no discharge  Throat: lips, mucosa, and tongue normal; teeth and gums normal  Neck: no adenopathy and supple, symmetrical, trachea midline  Lungs: clear to auscultation bilaterally  Heart: regular rate and rhythm, S1, S2 normal, no murmur, click, rub or gallop

## 2021-06-13 NOTE — PROGRESS NOTES
"Benja Rosas is a 50 y.o. male who is being evaluated via a billable video visit.      The patient has been notified of following:     \"This video visit will be conducted via a call between you and your physician/provider. We have found that certain health care needs can be provided without the need for an in-person physical exam.  This service lets us provide the care you need with a video conversation.  If a prescription is necessary we can send it directly to your pharmacy.  If lab work is needed we can place an order for that and you can then stop by our lab to have the test done at a later time.    Video visits are billed at different rates depending on your insurance coverage. Please reach out to your insurance provider with any questions.    If during the course of the call the physician/provider feels a video visit is not appropriate, you will not be charged for this service.\"    Patient has given verbal consent to a Video visit? Yes  How would you like to obtain your AVS? AVS Preference: MyChart.  If dropped by the video visit, the video invitation should be sent to: Text to cell phone: [phone].  Will anyone else be joining your video visit? No    Video Start Time: 8:53 AM     Patient presents today with worsening fatigue, sinus congestion, headache, facial discomfort, cough.  Tested negative for COVID-19 on Monday.  Getting copious mucus when blowing his nose.  Cough is generally productive.  Coughing jags can last up to 3 hours at a time.  Noticed some pressure along the roof of his mouth.  Has previously had recurrent coughing issues.  Received albuterol inhalers and prednisone tapers.  He also received a prescription for montelukast from Dr. Traylor, but has not started it yet.  He restarted fluticasone nasal spray about 2 weeks ago.    Additional provider notes:   GENERAL: Healthy, alert and no distress  EYES: Eyes grossly normal to inspection. No discharge or erythema, or obvious " scleral/conjunctival abnormalities.  RESP: No audible wheeze, or visible cyanosis.  No visible retractions or increased work of breathing.  Intermittent throat clearing  NEURO: Cranial nerves grossly intact. Mentation and speech appropriate for age.  PSYCH: Mentation appears normal, affect normal/bright, judgement and insight intact, normal speech and appearance well-groomed    1. Chronic sinusitis, unspecified location  amoxicillin-clavulanate (AUGMENTIN) 875-125 mg per tablet   2. Wheezing  albuterol (PROAIR HFA;PROVENTIL HFA;VENTOLIN HFA) 90 mcg/actuation inhaler    fluticasone propionate (FLOVENT HFA) 110 mcg/actuation inhaler   3. Bronchospasm  albuterol (PROAIR HFA;PROVENTIL HFA;VENTOLIN HFA) 90 mcg/actuation inhaler     Given persistence of symptoms, start Flovent inhaler.  Also Augmentin for persistent sinusitis, but for 14 days this time.  Review of records shows that this is worked well for Dr. Becerra in the past.  Can always try the montelukast prescribed by Dr. Traylor.  Let me know if not improving.    Video-Visit Details    Type of service:  Video Visit    Video End Time (time video stopped): 905AM  Originating Location (pt. Location): Home    Distant Location (provider location):  [facility]     Platform used for Video Visit: Yessi Veliz CNP

## 2021-06-15 NOTE — PROGRESS NOTES
1. Sinus congestion     2. Pain of finger of right hand     3. Bacterial sinusitis  amoxicillin-clavulanate (AUGMENTIN) 875-125 mg per tablet     Med list reconciled        ASSESSMENT/PLAN:     The following high BMI interventions were performed this visit: encouragement to exercise and weight monitoring    1. Sinus congestion      2. Pain of finger of right hand    Patient instructed to rest, ice the affected area several times per day for 10 minutes at a time, may use compression wrap to the area if pain and swelling occur and elevated the affected area whenever patient is able. May take Tylenol 1000 mg four times per day or Ibuprofen 800 mg three times daily for pain and inflammation.       3. Bacterial sinusitis    - amoxicillin-clavulanate (AUGMENTIN) 875-125 mg per tablet; Take 1 tablet by mouth 2 (two) times a day for 10 days.  Dispense: 20 tablet; Refill: 0    Return to clinic if symptoms persist or become severe in the next 10-14 days after completion of antibiotic therapy.  Patient declined x-ray of hand today.  Urged him to elevate the hand, begin using ice packs several times per day and take ibuprofen 3 times daily.    The visit lasted a total of 25 minutes face to face with the patient.  Over 50% of the time spent counseling and educating the patient about above content.      Carmen Parr NP          SUBJECTIVE:  Benja Rosas is a 47 y.o. male who presents with sinus pain that started 10 days ago.  He states the discomfort has been constant and describes it as a dull ache.  Aggravating factors: Laying flat.  Relieving factors: Sudafed, NyQuil and ibuprofen.  Additional symptoms include increased postnasal drainage, fevers and headache.  He is rating his sinus pain a 5 out of 10 today.  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  He has not traveled recently.  He has no history of chronic sinusitis or sinus surgery.  Additionally, he reports right finger  pain that started 2 days ago after he was lifting a battery out of a 4 juarez.  He states that he dropped a battery in his finger snap back.  Since that time he has noticed a decrease in  strength and describes his pain as a dull ache.  He does have difficulty gripping items, he has not spontaneously dropped any items at this point.  Relieving factors: He has not tried anything.  Rates his pain a 4 out of 10 today.  I did offer to x-ray the finger in order to rule out any sort of dislocation or fracture.  Patient declined today.  Encouraged him to begin using ice packs several times per day for the next week, ibuprofen as needed 3 times daily and elevating the extremity.  Also encouraged him to tape his affected finger to the adjacent finger for better support.  His vital signs are stable today, patient is afebrile  Chief Complaint   Patient presents with     URI     x 10 days - sinis pressure, nasal drainage, green mucus, fever, HA     Hand Injury     RT hand - ring finger was lifting a 4 juarez battery,  snapped back, loss strength         Patient Active Problem List   Diagnosis     Lumbar Radiculopathy     Vitamin D Deficiency     Low back pain       Current Outpatient Prescriptions   Medication Sig Dispense Refill     doxylamine-DM-acetaminophen (VICKS NYQUIL NIGHTTIME RELIEF) 6.25- mg/15 mL Liqd Take by mouth.       IBUPROFEN (ADVIL ORAL) Take by mouth.       PSEUDOEPHEDRINE HCL (SUDAFED ORAL) Take by mouth.       amoxicillin-clavulanate (AUGMENTIN) 875-125 mg per tablet Take 1 tablet by mouth 2 (two) times a day for 10 days. 20 tablet 0     No current facility-administered medications for this visit.        History   Smoking Status     Never Smoker   Smokeless Tobacco     Never Used       REVIEW OF SYSTEMS: Denies chills/sore throat/swollen glands/shortness of breath/discomfort of chest/abdominal pain/changes in bowel habits/urinary symptoms/rash.      TOBACCO USE:  History   Smoking Status      Never Smoker   Smokeless Tobacco     Never Used     Social History     Social History     Marital status: Single     Spouse name: N/A     Number of children: N/A     Years of education: N/A     Occupational History     Not on file.     Social History Main Topics     Smoking status: Never Smoker     Smokeless tobacco: Never Used     Alcohol use Yes      Comment: 2 per month     Drug use: No     Sexual activity: Not on file     Other Topics Concern     Not on file     Social History Narrative         OBJECTIVE:            Vitals:    01/03/18 0815   BP: 130/84   Pulse: 90   Resp: 16   Temp: 98.3  F (36.8  C)   SpO2: 93%     Weight: 199 lb (90.3 kg)  Wt Readings from Last 3 Encounters:   01/03/18 199 lb (90.3 kg)   08/21/17 197 lb 4.8 oz (89.5 kg)   12/22/16 191 lb (86.6 kg)     Body mass index is 28.55 kg/(m^2).        Physical Exam:  GENERAL APPEARANCE: A&A, NAD, well hydrated, well nourished  HEAD: atraumatic, no deformity, bilateral maxillary and frontal sinus discomfort with palpation  EYES: PERRL, EOM's intact, no redness or swelling  EARS: TM's with mild erythema bilaterally, no bulging, no perforation or fluid  NOSE: Moderate amount of thick, yellow-green mucus in right nare  MOUTH: Moderate erythema, no exudate or thrush  NECK: Supple, without lymphadenopathy, no thyroid mass, no JVD, no bruit  CV: RRR, no M/G/R   LUNGS: CTAB, normal respiratory effort  ABDOMEN: S&NT, no masses, no organomegaly, BS present x4   SKIN:  Normal skin turgor, no lesions/rashes, warm and dry

## 2021-06-16 PROBLEM — N40.1 BENIGN PROSTATIC HYPERPLASIA WITH URINARY FREQUENCY: Status: ACTIVE | Noted: 2020-09-10

## 2021-06-16 PROBLEM — J30.89 ENVIRONMENTAL AND SEASONAL ALLERGIES: Status: ACTIVE | Noted: 2020-08-24

## 2021-06-16 PROBLEM — R35.0 BENIGN PROSTATIC HYPERPLASIA WITH URINARY FREQUENCY: Status: ACTIVE | Noted: 2020-09-10

## 2021-06-18 NOTE — PROGRESS NOTES
"Assessment:  1.  Low back pain.  2.  Follow-up vitamin D deficiency.  3.  History of lumbar radiculopathy.    Plan: Prescribed cyclobenzaprine 10 mg/#15-1 p.o. 3 times daily as needed for muscle spasm.  Prescribed Medrol Dosepak 4 mg-take 6 p.o. the first day 5 the second day for the third day etc. over 6 day taper.  It is okay if he wants to hold off to see if he improves without having to take that.  Check vitamin D level and basic profile.  Follow-up if not improving well.    Subjective: 47-year-old male presenting for the follow-up of the above.  He notes that it was about a week ago that he had the onset of the low back pain and he had been riding a 4 juarez.  Then last Saturday he was cutting some trees and had flareup of the pain and it was more on the left lower back.  He notes that it is about 30-40% improved today but that yesterday was really bad.  He was having difficulty getting his foot up to be able to put on his socks.  He denies any numbness in the feet.  He had a history of lumbar radiculopathy in the past.  See past notes and see the vitamin D level that was extremely low in 2014.  No fever nausea or vomiting.  He notes that because of his past experience with his back trouble he came in today because of concern it would not get better.  He has used some ibuprofen but not more than 400 mg at a time.No past medical history on file.  Allergies   Allergen Reactions     Ciprofloxacin      Current medication includes the ibuprofen 400 mg as needed, he takes vitamin D 2000 units daily most days but has not taken it the last week.  All other review of systems are negative.    Objective:/76  Pulse 75  Ht 5' 10\" (1.778 m)  Wt 204 lb (92.5 kg)  SpO2 96%  BMI 29.27 kg/m2  HEENT examination is negative.  Neck supple without adenopathy or thyromegaly.  Lungs clear.  Heart regular rate and rhythm without murmur.  Abdomen shows no masses tenderness or hepatosplenomegaly.  No pedal edema.  Straight " leg raise test is technically negative bilateral but on the left side he did have some back difficulty with getting in the 50-60  range.

## 2021-06-18 NOTE — PATIENT INSTRUCTIONS - HE
Patient Instructions by Cassy Weller CNP at 2/1/2020  1:10 PM     Author: Cassy Weller CNP Service: -- Author Type: Nurse Practitioner    Filed: 2/1/2020  2:01 PM Encounter Date: 2/1/2020 Status: Addendum    : Cassy Weller CNP (Nurse Practitioner)    Related Notes: Original Note by Cassy Weller CNP (Nurse Practitioner) filed at 2/1/2020  2:01 PM       Albuterol as needed for wheezing, chest tightness.    Recheck if worsening shortness of breath despite using inhaler or develop any fever over 100.4.      Patient Education     Viral or Bacterial Bronchitis with Wheezing (Adult)    Bronchitis is an infection of the air passages. It often occurs during a cold and is usually caused by a virus. Symptoms include cough with mucus (phlegm) and low-grade fever. This illness is contagious during the first few days and is spread through the air by coughing and sneezing, or by direct contact (touching the sick person and then touching your own eyes, nose, or mouth).  If there is a lot of inflammation, air flow is restricted. The air passages may also go into spasm, especially if you have asthma. This causes wheezing and difficulty breathing even in people who do not have asthma.  Bronchitis usually lasts 7 to 14 days. The wheezing should improve with treatment during the first week. An inhaler is often prescribed to relax the air passages and stop wheezing. Antibiotics will be prescribed if your doctor thinks there is also a secondary bacterial infection.  Home care    If symptoms are severe, rest at home for the first 2 to 3 days. When you go back to your usual activities, don't let yourself get too tired.    Do not smoke. Also avoid being exposed to secondhand smoke.    You may use over-the-counter medicine to control fever or pain, unless another medicine was prescribed. Note: If you have chronic liver or kidney disease or have ever had a stomach ulcer or gastrointestinal bleeding, talk with your  healthcare provider before using these medicines. Also talk to your provider if you are taking medicine to prevent blood clots.) Aspirin should never be given to anyone younger than 18 years of age who is ill with a viral infection or fever. It may cause severe liver or brain damage.    Your appetite may be poor, so a light diet is fine. Avoid dehydration by drinking 6 to 8 glasses of fluids per day (such as water, soft drinks, sports drinks, juices, tea, or soup). Extra fluids will help loosen secretions in the nose and lungs.    Over-the-counter cough, cold, and sore-throat medicines will not shorten the length of the illness, but they may be helpful to reduce symptoms. (Note: Do not use decongestants if you have high blood pressure.)    If you were given an inhaler, use it exactly as directed. If you need to use it more often than prescribed, your condition may be worsening. If this happens, contact your healthcare provider.    If prescribed, finish all antibiotic medicine, even if you are feeling better after only a few days.  Follow-up care  Follow up with your healthcare provider, or as advised. If you had an X-ray or ECG (electrocardiogram), a specialist will review it. You will be notified of any new findings that may affect your care.  If you are age 65 or older, or if you have a chronic lung disease or condition that affects your immune system, or you smoke, ask your healthcare provider about getting a pneumococcal vaccine and a yearly flu shot (influenza vaccine).  When to seek medical advice  Call your healthcare provider right away if any of these occur:    Fever of 100.4 F (38 C) or higher, or as directed by your healthcare provider    Coughing up increasing amounts of colored sputum    Weakness, drowsiness, headache, facial pain, ear pain, or a stiff neck  Call 911  Call 911 if any of these occur.    Coughing up blood    Worsening weakness, drowsiness, headache, or stiff neck    Increased wheezing not  helped with medication, shortness of breath, or pain with breathing  Date Last Reviewed: 9/13/2015 2000-2017 [employer]. [address] [place] [address], [place] [address]. All rights reserved. This information is not intended as a substitute for professional medical care. Always follow your healthcare professional's instructions.           Patient Education     Inhaler Use  The inhaler that you were prescribed contains a potent medicine. It should only be used as directed. The medicine in your inhaler must be breathed deeply into your lungs for it to work. It will not work at all if it only reaches your mouth and throat. Follow the instructions below for best results. And remember to follow your asthma action plan as given to you by your doctor.  1. Keep your inhaler at room temperature.  2. Hold the inhaler so that the part that goes into your mouth is at the bottom.  3. Shake the inhaler well and remove the cap.  4. Breathe out through your mouth to fully empty your lungs.  5. Place the inhaler in your mouth and close your lips tightly around it. (Or hold the inhaler 1 to 2 inches from your open mouth if told to do so by your healthcare provider.)  6. Squeeze the inhaler as you breathe in slowly through your mouth until your lungs are full of air, drawing the medicine deep into your lungs.  7. Hold your breath for 10 seconds, or as long as you can comfortably hold your breath. Then breathe out slowly.  8. If you have been advised to take 2 puffs, wait 5 minutes, then repeat steps 3 to 7 above. Waiting 5 minutes between puffs will allow the medicine to open up your lungs so the second puff can get deeper into the lungs. Replace the cap when done.  9. If you were prescribed both a steroid inhaler and a bronchodilator inhaler, use the bronchodilator first to open the air passages. Wait 5 minutes, then use the steroid inhaler.  10. Rinse your mouth with water and spit it out (especially after using a steroid  inhaler). This is very important if you are using a steroid inhaler to prevent thrush, a mild yeast infection of the mouth and back of the throat.  11. A special chamber (spacer) may be prescribed that attaches to your inhaler. This increases the amount of medicine that goes to your lungs. It also improves how well each treatment works. Ask your doctor about this if you did not receive one.    Keep it clean  Remove the metal canister and do not immerse it in water. Then clean the plastic mouthpiece, cap, and spacer if you have one, by rinsing them well in warm running water for 30 to 60 seconds. Shake off excess water and allow the mouthpiece to dry completely (overnight is recommended). This should be done once a week. If you need the inhaler before the mouthpiece is dry, shake off excess water, replace the canister, and test spray 2 times (away from the face).  Warning  A steroid inhaler is used to prevent an asthma attack. Do not use this to treat an acute wheezing episode. Use only bronchodilator inhalers (quick relief) to treat an acute asthma attack.  If you find that your medicine is not working and you need to use it more often than prescribed, this could be a sign that your asthma is getting worse. Go to the emergency room or urgent care right away. An asthma attack is easiest to treat in the early stages before it becomes severe.  When to seek medical advice  Get prompt medical attention if any of the following occur:    Increased wheezing or shortness of breath    Need to use your inhalers more often than usual without relief    Fever of 100.4 F (38 C) or higher, or as directed by your healthcare provider    Coughing up lots of dark-colored or bloody sputum (mucus)    Chest pain with each breath    Blue lips or fingernails    Peak flow reading less than 50% of your normal best  Date Last Reviewed: 5/1/2017 2000-2017 [employer]. [address] [place] [address], [place] [address]. All rights  reserved. This information is not intended as a substitute for professional medical care. Always follow your healthcare professional's instructions.

## 2021-06-20 NOTE — LETTER
Letter by Lesley Traylor MD at      Author: Lesley Traylor MD Service: -- Author Type: --    Filed:  Encounter Date: 9/21/2020 Status: (Other)         September 21, 2020     Brenda Veloz MD  [address] [facility]  [facility] [address]  [facility] [address]    Patient: Benja Rosas   MR Number: [id number]   YOB: 1970   Date of Visit: 9/21/2020     Dear Dr. Magdiel MD:    Thank you for referring Benja Rosas to me for evaluation.  Allergy testing was positive for multiple aeroallergens.  I do think he has allergic intermittent asthma.  I have included my note for review.    If you have questions, please do not hesitate to call me.   Sincerely,        Lesley Traylor MD        CC  No Recipients    Progress Notes:Chief complaint: Allergies    History of present illness: This is a pleasant 49-year-old gentleman I was asked to see for evaluation by Dr. Veloz in regards to allergies.  Patient states in February this year he was very sick with what was thought to be pneumonia.  Chest x-ray at that time was negative.  He states that that then in May became sick again.  He had a significant cough.  The cough will wake him up at night.  He coughs for about 2 hours at a time.  He becomes somewhat breathless with this.  He was given prednisone and albuterol inhaler and symptoms quickly resolved.  This happened again in August.  He states again he required prednisone and albuterol inhaler.  He reports albuterol here did help and temporarily relieves symptoms.  He has noted a runny nose and congestion he states when his head becomes very plugged up it seems to go right to his chest.  He is never been previously diagnosed with allergies or asthma.  He does have a strong family history of this and suspects he may have allergies, however.  He has not used any allergy medication regularly.  He did try Zyrtec and Flonase during the second bout in May, however, it did not seem to help.    Past medical  "history: Otherwise unremarkable    Social history: He is an , has a cat at home, no recent changes at home, lives at home with central air basement, no exposure to mold, non-smoker, non-smoking environment    Family history: Mother and sister with allergies and asthma    Review of Systems performed as above and the remainder is negative.       Current Outpatient Medications:   ?  ascorbic acid (VITAMIN C ORAL), Take by mouth., Disp: , Rfl:   ?  cholecalciferol, vitamin D3, (VITAMIN D3 ORAL), Take 2,000 Units by mouth daily., Disp: , Rfl:   ?  MEN'S MULTI-VITAMIN ORAL, Take by mouth., Disp: , Rfl:   ?  albuterol (PROAIR HFA;PROVENTIL HFA;VENTOLIN HFA) 90 mcg/actuation inhaler, Inhale 2 puffs every 6 (six) hours as needed for wheezing., Disp: 1 each, Rfl: 0  ?  montelukast (SINGULAIR) 10 mg tablet, Take 1 tablet (10 mg total) by mouth at bedtime., Disp: 30 tablet, Rfl: 1  Allergies   Allergen Reactions   ? Ciprofloxacin Unknown       Pulse 75   Ht 5' 8.5\" (1.74 m)   Wt 183 lb (83 kg)   SpO2 99%   BMI 27.42 kg/m    Gen: Pleasant male not in acute distress  HEENT: Eyes no erythema of the bulbar or palpebral conjunctiva, no edema. Ears: TMs well visualized, no effusions. Nose: No congestion, mucosa normal. Mouth: Throat clear, no lip or tongue edema.   Cardiac: Regular rate and rhythm, no murmurs, rubs or gallops  Respiratory: Clear to auscultation bilaterally, no adventitious breath sounds  Lymph: No supraclavicular or cervical lymphadenopathy  Skin: No rashes or lesions  Psych: Alert and oriented times 3    Last Percutaneous Allergy Test Results  Trees  Casper, White  1:20 H  (W/F in mm): 0-0 (09/21/20 1118)  Birch Mix 1:20 H (W/F in mm): 5-15 (09/21/20 1118)  Logan, Common 1:20 H (W/F in mm): 0-0 (09/21/20 1118)  Elm, American 1:20 H (W/F in mm): 0-0 (09/21/20 1118)  Thang Rodríguez 1:20 H (W/F in mm): 0-0 (09/21/20 1118)  Maple, Hard/Sugar 1:20 H (W/F in mm): 0-0 (09/21/20 1118)  Anniston Mix 1:20 " H (W/F in mm): 0-0 (09/21/20 1118)  Cornell, Red 1:20 H (W/F in mm): 0-0 (09/21/20 1118)  Red Wing, American 1:20 H (W/F in mm): 0-0 (09/21/20 1118)  McConnell Tree 1:20 H (W/F in mm): 0-0 (09/21/20 1118)  Dust Mites  D. Pteronyssinus Mite 30,000 AU/ML H (W/F in mm): 5-20 (09/21/20 1118)  D. Farinae Mite 30,000 AU/ML H (W/F in mm: 5-15 (09/21/20 1118)  Grasses  Grass Mix #4 10,000 BAU/ML H: 5-15 (09/21/20 1118)  Lance Grass 1:20 H (W/F in mm): 4-15 (09/21/20 1118)  Cockroach  Cockroach Mix 1:10 H (W/F in mm): 0-0 (09/21/20 1118)  Molds/Fungi  Alternaria Tenuis 1:10 H (W/F in mm): 0-0 (09/21/20 1118)  Aspergillus Fumigatus 1:10 H (W/F in mm): 0-0 (09/21/20 1118)  Homodendrum Cladosporioides 1:10 H (W/F in mm): 0-0 (09/21/20 1118)  Penicillin Notatum 1:10 H (W/F in mm): 0-0 (09/21/20 1118)  Epicoccum 1:10 H (W/F in mm): 0-0 (09/21/20 1118)  Weeds  Ragweed, Short 1:20 H (W/F in mm): 6-22 (09/21/20 1118)  Dock, Sorrel 1:20 H (W/F in mm): 0-0 (09/21/20 1118)  Lamb's Quarter 1:20 H (W/F in mm): 0-0 (09/21/20 1118)  Pigweed, Rough Red Root 1:20 H  (W/F in mm): 0-0 (09/21/20 1118)  Plantain, English 1:20 H  (W/F in mm): 0-0 (09/21/20 1118)  Sagebrush, Mugwort 1:20 H  (W/F in mm): 0-0 (09/21/20 1118)  Animal  Cat 10,000 BAU/ML H (W/F in mm): 0-0 (09/21/20 1118)  AP Dog Hair/Dander 1:100: 0-0 (09/21/20 1118)  Controls  Device Type: QUINTIP (09/21/20 1118)  Neg. control: 50% Glycerine/Saline H (W/F in mm): 0-0 (09/21/20 1118)  Pos. control: Histamine 6mg/ML (W/F in mms): 5-10 (09/21/20 1118)      Impression report and plan:  1.  Allergic rhinitis  2.  Intermittent asthma    Recommended a trial of montelukast.  Cautioned him to the rare side effect of mood disturbance.  If he notes breakthrough symptoms, I would be quick to add a controller medication.  Could consider reimaging of the chest as well.  He may be able to wean off this medication during the winter but should restart this prior to April 1.  He should use this during  spring, summer and fall.  Reviewed dust mite control.

## 2021-06-22 NOTE — PROGRESS NOTES
Chief Complaint   Patient presents with     Sinus Problem     Pt c/o fever, L side pressure pain, cold sx with dark green brownish phlegm x 3 weeks       HPI: Very pleasant 48-year-old male with a past history of upper respiratory infections, presents today with facial pain, pressure, with copious amounts of thick tenacious green mucus for 10 days in duration of moderate intensity.    ROS: No fever.  No vomiting or diarrhea or rashes    SH:    reports that  has never smoked. he has never used smokeless tobacco. He reports that he drinks alcohol. He reports that he does not use drugs.      FH: The Patient's family history is not on file.  Multiple family members are ill    Meds:  Benja has a current medication list which includes the following prescription(s): amoxicillin-clavulanate, cyclobenzaprine, ibuprofen, and methylprednisolone.    O:  /80   Pulse 66   Resp 16   Wt 193 lb 9 oz (87.8 kg)   SpO2 97%   BMI 27.77 kg/m     Constitutional:    --Vitals as above  --No acute distress  Eyes-  --Sclera noninjected  --Lids and conjunctiva normal  ENT-  --TMs clear  --Sclera noninjected  --Pharynx not erythematous with copious amounts of green postnasal drainage  -Mild left maxillary sinus fullness  Neck-  --Neck supple    Lymph-  --No cervical lymphadenopathy  Lungs-  --Clear to Auscultation  Heart-  --Regular rate and rhythm  Skin-  --Pink and dry          A/P:   1. Acute maxillary sinusitis, recurrence not specified  The patient has a history of repeated upper respiratory infections.  We will treat conservatively for 14 days with Augmentin.  -Encouraged sinus irrigation  - amoxicillin-clavulanate (AUGMENTIN) 875-125 mg per tablet; Take 1 tablet by mouth 2 (two) times a day for 14 days.  Dispense: 28 tablet; Refill: 0

## 2021-06-26 ENCOUNTER — HEALTH MAINTENANCE LETTER (OUTPATIENT)
Age: 51
End: 2021-06-26

## 2021-06-28 NOTE — PROGRESS NOTES
Progress Notes by Cassy Weller CNP at 2/1/2020  1:10 PM     Author: Cassy Weller CNP Service: -- Author Type: Nurse Practitioner    Filed: 2/1/2020  2:03 PM Encounter Date: 2/1/2020 Status: Signed    : Cassy Weller CNP (Nurse Practitioner)       Chief Complaint   Patient presents with   ? Shortness of Breath     wheezing, cough little, tightness,  started 12/1 for chest cold       ASSESSMENT & PLAN:   Diagnoses and all orders for this visit:    Acute bronchitis, unspecified organism    Wheezing  -     albuterol nebulizer solution 3 mL (PROVENTIL)        MDM:  Lungs is completely clear following 1 nebulizer given in clinic.  Patient states breathing is easier as well.  Will prescribe albuterol.  Recheck if fevers.  Differential includes pneumonia, but lungs are completely clear following 1 nebulizer without tachypnea, fevers, so unlikely.    Supportive care discussed.  See discharge instructions below for specific recommendations given.    At the end of the encounter, I discussed results, diagnosis, medications. Discussed red flags for immediate return to clinic/ER, as well as indications for follow up if no improvement. Patient and/or caregiver understood and agreed to plan. Patient was stable for discharge.    SUBJECTIVE    HPI:  HPI  Benja Rosas presents to the walk-in clinic with   Chief Complaint   Patient presents with   ? Shortness of Breath     wheezing, cough little, tightness,  started 12/1 for chest cold     Had URI that resolved starting 12/1.  Two weeks after this URI resolved, URI sx returned 7 days ago.      +Wheezing, shortness of breath    Taking Sudafed     Associated with: maybe off and on, hasn't checked, mild headache.    Denies: shaking chills, sweats, vomiting, asthma    Known exposures: none     See ROS for additional symptoms and/or pertinent negatives.       History obtained from the patient.    No past medical history on file.    Active Ambulatory (Non-Hospital)  Problems    Diagnosis   ? Low back pain   ? Lumbar Radiculopathy   ? Vitamin D Deficiency       No family history on file.    Social History     Tobacco Use   ? Smoking status: Never Smoker   ? Smokeless tobacco: Never Used   Substance Use Topics   ? Alcohol use: Yes     Comment: 2 per month       Review of Systems   Constitutional: Negative for fever.   HENT: Negative for ear pain and sore throat.    Respiratory: Positive for cough and wheezing.        OBJECTIVE    Vitals:    02/01/20 1320 02/01/20 1326   BP: (!) 144/97 (!) 132/91   Patient Site: Right Arm Left Arm   Patient Position: Sitting Sitting   Cuff Size: Adult Large Adult Large   Pulse: 90 90   Resp: 20    Temp: 98.4  F (36.9  C)    TempSrc: Oral    SpO2: 93%    Weight: 190 lb (86.2 kg)        Physical Exam  Constitutional:       Appearance: He is well-developed.   HENT:      Right Ear: External ear normal.      Left Ear: External ear normal.      Nose: No congestion or rhinorrhea.      Mouth/Throat:      Pharynx: Posterior oropharyngeal erythema (mild ) present.   Eyes:      General:         Right eye: No discharge.         Left eye: No discharge.   Cardiovascular:      Rate and Rhythm: Normal rate and regular rhythm.      Heart sounds: Normal heart sounds. No murmur.   Pulmonary:      Effort: Pulmonary effort is normal. No respiratory distress.      Breath sounds: Wheezing present. No rales.   Chest:      Chest wall: No tenderness.   Lymphadenopathy:      Cervical: No cervical adenopathy.   Skin:     General: Skin is warm and dry.      Findings: No rash.   Neurological:      Mental Status: He is alert and oriented to person, place, and time.   Psychiatric:         Mood and Affect: Mood normal.         Behavior: Behavior normal.         Thought Content: Thought content normal.         Judgment: Judgment normal.         Labs:  No results found for this or any previous visit (from the past 240 hour(s)).      Radiology:    No results found.    PATIENT  INSTRUCTIONS:   Patient Instructions   Albuterol as needed for wheezing, chest tightness.    Recheck if worsening shortness of breath despite using inhaler or develop any fever over 100.4.      Patient Education     Viral or Bacterial Bronchitis with Wheezing (Adult)    Bronchitis is an infection of the air passages. It often occurs during a cold and is usually caused by a virus. Symptoms include cough with mucus (phlegm) and low-grade fever. This illness is contagious during the first few days and is spread through the air by coughing and sneezing, or by direct contact (touching the sick person and then touching your own eyes, nose, or mouth).  If there is a lot of inflammation, air flow is restricted. The air passages may also go into spasm, especially if you have asthma. This causes wheezing and difficulty breathing even in people who do not have asthma.  Bronchitis usually lasts 7 to 14 days. The wheezing should improve with treatment during the first week. An inhaler is often prescribed to relax the air passages and stop wheezing. Antibiotics will be prescribed if your doctor thinks there is also a secondary bacterial infection.  Home care    If symptoms are severe, rest at home for the first 2 to 3 days. When you go back to your usual activities, don't let yourself get too tired.    Do not smoke. Also avoid being exposed to secondhand smoke.    You may use over-the-counter medicine to control fever or pain, unless another medicine was prescribed. Note: If you have chronic liver or kidney disease or have ever had a stomach ulcer or gastrointestinal bleeding, talk with your healthcare provider before using these medicines. Also talk to your provider if you are taking medicine to prevent blood clots.) Aspirin should never be given to anyone younger than 18 years of age who is ill with a viral infection or fever. It may cause severe liver or brain damage.    Your appetite may be poor, so a light diet is fine.  Avoid dehydration by drinking 6 to 8 glasses of fluids per day (such as water, soft drinks, sports drinks, juices, tea, or soup). Extra fluids will help loosen secretions in the nose and lungs.    Over-the-counter cough, cold, and sore-throat medicines will not shorten the length of the illness, but they may be helpful to reduce symptoms. (Note: Do not use decongestants if you have high blood pressure.)    If you were given an inhaler, use it exactly as directed. If you need to use it more often than prescribed, your condition may be worsening. If this happens, contact your healthcare provider.    If prescribed, finish all antibiotic medicine, even if you are feeling better after only a few days.  Follow-up care  Follow up with your healthcare provider, or as advised. If you had an X-ray or ECG (electrocardiogram), a specialist will review it. You will be notified of any new findings that may affect your care.  If you are age 65 or older, or if you have a chronic lung disease or condition that affects your immune system, or you smoke, ask your healthcare provider about getting a pneumococcal vaccine and a yearly flu shot (influenza vaccine).  When to seek medical advice  Call your healthcare provider right away if any of these occur:    Fever of 100.4 F (38 C) or higher, or as directed by your healthcare provider    Coughing up increasing amounts of colored sputum    Weakness, drowsiness, headache, facial pain, ear pain, or a stiff neck  Call 911  Call 911 if any of these occur.    Coughing up blood    Worsening weakness, drowsiness, headache, or stiff neck    Increased wheezing not helped with medication, shortness of breath, or pain with breathing  Date Last Reviewed: 9/13/2015 2000-2017 [employer]. [address] [place] [address], [place] [address]. All rights reserved. This information is not intended as a substitute for professional medical care. Always follow your healthcare professional's  instructions.           Patient Education     Inhaler Use  The inhaler that you were prescribed contains a potent medicine. It should only be used as directed. The medicine in your inhaler must be breathed deeply into your lungs for it to work. It will not work at all if it only reaches your mouth and throat. Follow the instructions below for best results. And remember to follow your asthma action plan as given to you by your doctor.  1. Keep your inhaler at room temperature.  2. Hold the inhaler so that the part that goes into your mouth is at the bottom.  3. Shake the inhaler well and remove the cap.  4. Breathe out through your mouth to fully empty your lungs.  5. Place the inhaler in your mouth and close your lips tightly around it. (Or hold the inhaler 1 to 2 inches from your open mouth if told to do so by your healthcare provider.)  6. Squeeze the inhaler as you breathe in slowly through your mouth until your lungs are full of air, drawing the medicine deep into your lungs.  7. Hold your breath for 10 seconds, or as long as you can comfortably hold your breath. Then breathe out slowly.  8. If you have been advised to take 2 puffs, wait 5 minutes, then repeat steps 3 to 7 above. Waiting 5 minutes between puffs will allow the medicine to open up your lungs so the second puff can get deeper into the lungs. Replace the cap when done.  9. If you were prescribed both a steroid inhaler and a bronchodilator inhaler, use the bronchodilator first to open the air passages. Wait 5 minutes, then use the steroid inhaler.  10. Rinse your mouth with water and spit it out (especially after using a steroid inhaler). This is very important if you are using a steroid inhaler to prevent thrush, a mild yeast infection of the mouth and back of the throat.  11. A special chamber (spacer) may be prescribed that attaches to your inhaler. This increases the amount of medicine that goes to your lungs. It also improves how well each  treatment works. Ask your doctor about this if you did not receive one.    Keep it clean  Remove the metal canister and do not immerse it in water. Then clean the plastic mouthpiece, cap, and spacer if you have one, by rinsing them well in warm running water for 30 to 60 seconds. Shake off excess water and allow the mouthpiece to dry completely (overnight is recommended). This should be done once a week. If you need the inhaler before the mouthpiece is dry, shake off excess water, replace the canister, and test spray 2 times (away from the face).  Warning  A steroid inhaler is used to prevent an asthma attack. Do not use this to treat an acute wheezing episode. Use only bronchodilator inhalers (quick relief) to treat an acute asthma attack.  If you find that your medicine is not working and you need to use it more often than prescribed, this could be a sign that your asthma is getting worse. Go to the emergency room or urgent care right away. An asthma attack is easiest to treat in the early stages before it becomes severe.  When to seek medical advice  Get prompt medical attention if any of the following occur:    Increased wheezing or shortness of breath    Need to use your inhalers more often than usual without relief    Fever of 100.4 F (38 C) or higher, or as directed by your healthcare provider    Coughing up lots of dark-colored or bloody sputum (mucus)    Chest pain with each breath    Blue lips or fingernails    Peak flow reading less than 50% of your normal best  Date Last Reviewed: 5/1/2017 2000-2017 [employer]. [address] [place] [address], [place] [address]. All rights reserved. This information is not intended as a substitute for professional medical care. Always follow your healthcare professional's instructions.

## 2021-07-03 NOTE — ADDENDUM NOTE
Addendum Note by Lupe Ruiz CNP at 2/1/2020  1:10 PM     Author: Lupe Ruiz CNP Service: -- Author Type: Nurse Practitioner    Filed: 2/1/2020  2:04 PM Encounter Date: 2/1/2020 Status: Signed    : Lupe Ruiz CNP (Nurse Practitioner)    Addended by: LUPE RUIZ on: 2/1/2020 02:04 PM        Modules accepted: Orders

## 2021-10-16 ENCOUNTER — HEALTH MAINTENANCE LETTER (OUTPATIENT)
Age: 51
End: 2021-10-16

## 2021-11-23 ENCOUNTER — OFFICE VISIT (OUTPATIENT)
Dept: FAMILY MEDICINE | Facility: CLINIC | Age: 51
End: 2021-11-23
Payer: COMMERCIAL

## 2021-11-23 VITALS
HEIGHT: 69 IN | HEART RATE: 73 BPM | DIASTOLIC BLOOD PRESSURE: 86 MMHG | SYSTOLIC BLOOD PRESSURE: 122 MMHG | WEIGHT: 194 LBS | OXYGEN SATURATION: 96 % | BODY MASS INDEX: 28.73 KG/M2

## 2021-11-23 DIAGNOSIS — Z23 HIGH PRIORITY FOR 2019-NCOV VACCINE: ICD-10-CM

## 2021-11-23 DIAGNOSIS — M79.675 PAIN OF TOE OF LEFT FOOT: Primary | ICD-10-CM

## 2021-11-23 DIAGNOSIS — M10.9 ACUTE GOUT INVOLVING TOE, UNSPECIFIED CAUSE, UNSPECIFIED LATERALITY: ICD-10-CM

## 2021-11-23 PROCEDURE — 0004A COVID-19,PF,PFIZER (12+ YRS): CPT | Performed by: NURSE PRACTITIONER

## 2021-11-23 PROCEDURE — 91300 COVID-19,PF,PFIZER (12+ YRS): CPT | Performed by: NURSE PRACTITIONER

## 2021-11-23 PROCEDURE — 84550 ASSAY OF BLOOD/URIC ACID: CPT | Performed by: NURSE PRACTITIONER

## 2021-11-23 PROCEDURE — 80048 BASIC METABOLIC PNL TOTAL CA: CPT | Performed by: NURSE PRACTITIONER

## 2021-11-23 PROCEDURE — 36415 COLL VENOUS BLD VENIPUNCTURE: CPT | Performed by: NURSE PRACTITIONER

## 2021-11-23 PROCEDURE — 99213 OFFICE O/P EST LOW 20 MIN: CPT | Mod: 25 | Performed by: NURSE PRACTITIONER

## 2021-11-23 RX ORDER — INDOMETHACIN 50 MG/1
50 CAPSULE ORAL
Qty: 20 CAPSULE | Refills: 0 | Status: SHIPPED | OUTPATIENT
Start: 2021-11-23 | End: 2023-09-06

## 2021-11-23 RX ORDER — TRETINOIN 0.5 MG/G
CREAM TOPICAL
COMMUNITY
Start: 2021-10-21

## 2021-11-23 RX ORDER — FLUTICASONE PROPIONATE 110 UG/1
2 AEROSOL, METERED RESPIRATORY (INHALATION)
COMMUNITY
Start: 2020-11-12 | End: 2022-10-16

## 2021-11-23 ASSESSMENT — ASTHMA QUESTIONNAIRES
ACT_TOTALSCORE: 25
QUESTION_3 LAST FOUR WEEKS HOW OFTEN DID YOUR ASTHMA SYMPTOMS (WHEEZING, COUGHING, SHORTNESS OF BREATH, CHEST TIGHTNESS OR PAIN) WAKE YOU UP AT NIGHT OR EARLIER THAN USUAL IN THE MORNING: NOT AT ALL
QUESTION_1 LAST FOUR WEEKS HOW MUCH OF THE TIME DID YOUR ASTHMA KEEP YOU FROM GETTING AS MUCH DONE AT WORK, SCHOOL OR AT HOME: NONE OF THE TIME
QUESTION_2 LAST FOUR WEEKS HOW OFTEN HAVE YOU HAD SHORTNESS OF BREATH: NOT AT ALL
QUESTION_5 LAST FOUR WEEKS HOW WOULD YOU RATE YOUR ASTHMA CONTROL: COMPLETELY CONTROLLED
QUESTION_4 LAST FOUR WEEKS HOW OFTEN HAVE YOU USED YOUR RESCUE INHALER OR NEBULIZER MEDICATION (SUCH AS ALBUTEROL): NOT AT ALL

## 2021-11-23 ASSESSMENT — MIFFLIN-ST. JEOR: SCORE: 1707.42

## 2021-11-23 NOTE — PATIENT INSTRUCTIONS
I suspect we're dealing with a flare up of gout.    We are going to check a uric acid level today to get some more clarity.    I did send the pain medicine indomethacin to your pharmacy.  You can use this if pain is bothering you.  It can be taken up to 3 times a day.  Take it with food in the stomach.    Based on results, we can figure out next steps such as the allopurinol as an option to try to prevent this in the future    Covid booster given today.      Patient Education     What Is Gout?  Gout is a disease that affects the joints. Left untreated, it can lead to painful foot and joint deformities and even kidney problems. But, by treating gout early, you can relieve pain and help prevent future problems. Gout can usually be treated with medicine and proper diet. In severe cases, surgery may be needed.  What causes gout?  Gout is caused by an excess of uric acid (a waste product made by the body). Uric acid is excreted by the kidneys. If the uric acid level in your blood rises too high, the uric acid may form crystals that collect in the joints, bringing on a gout attack. If you have many gout attacks, crystals may form large deposits called tophi. Tophi can damage joints and cause deformity.  Who is at risk for gout?  Men are more likely to have gout than women. But women can also be affected, mostly after menopause. Some health problems, such as obesity and high cholesterol, make gout more likely. And some medicines, such as diuretics ( water pills ), can trigger a gout attack. People who drink a lot of alcohol are at high risk for gout. Certain foods can also trigger a gout attack.  Substances that may trigger a gout attack  To help prevent a gout attack, avoid these foods:    Alcohol (particularly beer, but also red wine and spirits)    Certain meats (red meat, processed meat, turkey)    Organ meats (kidney, liver, sweetbread)    Shellfish (lobster, crab, shrimp, scallop, mussel)    Certain fish (anchovy,  "sardine, herring, mackerel)  Treatment    Lifestyle changes, including weight loss, exercise, and quitting tobacco use    Reducing consumption of the food groups above as well as high fructose corn syrup, found in many foods including sodas and energy drinks    Changing non-essential medicines that may contribute to gout (such as thiazide diuretics--\"water pills\")    Medicines to reduce the amount of uric acid in the blood, such as allopurinol, probencid, febuxostat, and lesinurad.    Medicines to treat acute gout attacks, including NSAIDs (nonsteroidal anti-inflammatory medicines), steroids, and colchicine    [employer] last reviewed this educational content on 4/1/2018 2000-2021 [employer]. All rights reserved. This information is not intended as a substitute for professional medical care. Always follow your healthcare professional's instructions.           "

## 2021-11-23 NOTE — LETTER
My Asthma Action Plan    Name: Benja Rosas   YOB: 1970  Date: 11/23/2021   My doctor: Abelardo Veliz NP   My clinic: [facility]        My Rescue Medicine:   Albuterol inhaler (Proair/Ventolin/Proventil HFA)  2-4 puffs EVERY 4 HOURS as needed. Use a spacer if recommended by your provider.   My Asthma Severity:   Intermittent / Exercise Induced  Know your asthma triggers: upper respiratory infections, dust mites, pollens and mold             GREEN ZONE   Good Control    I feel good    No cough or wheeze    Can work, sleep and play without asthma symptoms       Take your asthma control medicine every day.     1. If exercise triggers your asthma, take your rescue medication    15 minutes before exercise or sports, and    During exercise if you have asthma symptoms  2. Spacer to use with inhaler: If you have a spacer, make sure to use it with your inhaler             YELLOW ZONE Getting Worse  I have ANY of these:    I do not feel good    Cough or wheeze    Chest feels tight    Wake up at night   1. Keep taking your Green Zone medications  2. Start taking your rescue medicine:    every 20 minutes for up to 1 hour. Then every 4 hours for 24-48 hours.  3. If you stay in the Yellow Zone for more than 12-24 hours, contact your doctor.  4. If you do not return to the Green Zone in 12-24 hours or you get worse, start taking your oral steroid medicine if prescribed by your provider.           RED ZONE Medical Alert - Get Help  I have ANY of these:    I feel awful    Medicine is not helping    Breathing getting harder    Trouble walking or talking    Nose opens wide to breathe       1. Take your rescue medicine NOW  2. If your provider has prescribed an oral steroid medicine, start taking it NOW  3. Call your doctor NOW  4. If you are still in the Red Zone after 20 minutes and you have not reached your doctor:    Take your rescue medicine again and    Call 911 or go to the  emergency room right away    See your regular doctor within 2 weeks of an Emergency Room or Urgent Care visit for follow-up treatment.          Annual Reminders:  Meet with Asthma Educator,  Flu Shot in the Fall, consider Pneumonia Vaccination for patients with asthma (aged 19 and older).    Pharmacy: [id number]  [place] [address] [address]  AT [facility] [address]    Electronically signed by Abelardo Veliz NP   Date: 11/23/21                    Asthma Triggers  How To Control Things That Make Your Asthma Worse    Triggers are things that make your asthma worse.  Look at the list below to help you find your triggers and   what you can do about them. You can help prevent asthma flare-ups by staying away from your triggers.      Trigger                                                          What you can do   Cigarette Smoke  Tobacco smoke can make asthma worse. Do not allow smoking in your home, car or around you.  Be sure no one smokes at a child s day care or school.  If you smoke, ask your health care provider for ways to help you quit.  Ask family members to quit too.  Ask your health care provider for a referral to Quit Plan to help you quit smoking, or call 9-006-942-PLAN.     Colds, Flu, Bronchitis  These are common triggers of asthma. Wash your hands often.  Don t touch your eyes, nose or mouth.  Get a flu shot every year.     Dust Mites  These are tiny bugs that live in cloth or carpet. They are too small to see. Wash sheets and blankets in hot water every week.   Encase pillows and mattress in dust mite proof covers.  Avoid having carpet if you can. If you have carpet, vacuum weekly.   Use a dust mask and HEPA vacuum.   Pollen and Outdoor Mold  Some people are allergic to trees, grass, or weed pollen, or molds. Try to keep your windows closed.  Limit time out doors when pollen count is high.   Ask you health care provider about taking medicine during allergy season.     Animal  Dander  Some people are allergic to skin flakes, urine or saliva from pets with fur or feathers. Keep pets with fur or feathers out of your home.    If you can t keep the pet outdoors, then keep the pet out of your bedroom.  Keep the bedroom door closed.  Keep pets off cloth furniture and away from stuffed toys.     Mice, Rats, and Cockroaches  Some people are allergic to the waste from these pests.   Cover food and garbage.  Clean up spills and food crumbs.  Store grease in the refrigerator.   Keep food out of the bedroom.   Indoor Mold  This can be a trigger if your home has high moisture. Fix leaking faucets, pipes, or other sources of water.   Clean moldy surfaces.  Dehumidify basement if it is damp and smelly.   Smoke, Strong Odors, and Sprays  These can reduce air quality. Stay away from strong odors and sprays, such as perfume, powder, hair spray, paints, smoke incense, paint, cleaning products, candles and new carpet.   Exercise or Sports  Some people with asthma have this trigger. Be active!  Ask your doctor about taking medicine before sports or exercise to prevent symptoms.    Warm up for 5-10 minutes before and after sports or exercise.     Other Triggers of Asthma  Cold air:  Cover your nose and mouth with a scarf.  Sometimes laughing or crying can be a trigger.  Some medicines and food can trigger asthma.

## 2021-11-23 NOTE — PROGRESS NOTES
"Chief Complaint   Patient presents with     Toe Pain     Having issues with 2 big toes. Pain in both of them. Comes and goes. For about a year now. They get swollen, goes away and comes back.      Imm/Inj     COVID-19 VACCINE       HPI: Patient presents today with complaints of bilateral toe pain.  Primarily occurs along the large toes.  Pain tends to be diffuse.  He will have sensitivities to bedsheets touching them.  This happened first about 9 months ago and was miserable and is starting to flareup again.  Atraumatic.  Afebrile without chills.  No recent diet changes.  No numbness or tingling.      ROS:Review of Systems - negative except for what's listed in the HPI    SH: The Patient's  reports that he has never smoked. He has never used smokeless tobacco. He reports current alcohol use. He reports that he does not use drugs.      FH: The Patient's family history includes Asthma in his mother; Heart Failure in his mother; Lung Cancer in his father; No Known Problems in his brother; Other - See Comments in his sister.     Meds:    Current Outpatient Medications   Medication     albuterol (PROAIR HFA;PROVENTIL HFA;VENTOLIN HFA) 90 mcg/actuation inhaler     ascorbic acid (VITAMIN C ORAL)     cholecalciferol, vitamin D3, (VITAMIN D3 ORAL)     fluticasone (FLOVENT HFA) 110 MCG/ACT inhaler     fluticasone propionate (FLOVENT HFA) 110 mcg/actuation inhaler     indomethacin (INDOCIN) 50 MG capsule     MEN'S MULTI-VITAMIN ORAL     tretinoin (RETIN-A) 0.05 % external cream     montelukast (SINGULAIR) 10 mg tablet     No current facility-administered medications for this visit.       O:  /86   Pulse 73   Ht 1.74 m (5' 8.5\")   Wt 88 kg (194 lb)   SpO2 96%   BMI 29.07 kg/m      Physical Examination:   General appearance - alert, well appearing, and in no distress  Neurological -grossly intact lower extremity sensation to touch.  Musculoskeletal -along great toe there is some discomfort with flexion, but no overt " redness or swelling.  No crepitus with movement.  Full range of motion.  Extremities - peripheral pulses normal, no peripheral edema  Skin - normal coloration and turgor.      A/P:       ICD-10-CM    1. Pain of toe of left foot  M79.675 Uric acid     indomethacin (INDOCIN) 50 MG capsule     Uric acid   2. High priority for 2019-nCoV vaccine  Z23 COVID-19,PF,PFIZER (12+ Yrs PURPLE LABEL)   3. Acute gout involving toe, unspecified cause, unspecified laterality  M10.9 Basic metabolic panel     Basic metabolic panel     Given symptoms described, sounds consistent with gout.  Indomethacin as needed for pain.  Take with food.  Check uric acid levels.  Discussed dietary changes to reduce risk of recurrent flareups. Covid booster given.    Pain of toe of left foot  - Uric acid  - indomethacin (INDOCIN) 50 MG capsule  Dispense: 20 capsule; Refill: 0  - Uric acid    High priority for 2019-nCoV vaccine  - COVID-19,PF,PFIZER (12+ Yrs PURPLE LABEL)    Acute gout involving toe, unspecified cause, unspecified laterality  - Basic metabolic panel  - Basic metabolic panel        Abelardo Veliz, CNP      This note has been dictated using voice recognition software. Any grammatical or context distortions are unintentional and inherent to the software.       1.76

## 2021-11-24 LAB
ANION GAP SERPL CALCULATED.3IONS-SCNC: 9 MMOL/L (ref 5–18)
BUN SERPL-MCNC: 8 MG/DL (ref 8–22)
CALCIUM SERPL-MCNC: 9.6 MG/DL (ref 8.5–10.5)
CHLORIDE BLD-SCNC: 103 MMOL/L (ref 98–107)
CO2 SERPL-SCNC: 29 MMOL/L (ref 22–31)
CREAT SERPL-MCNC: 0.81 MG/DL (ref 0.7–1.3)
GFR SERPL CREATININE-BSD FRML MDRD: >90 ML/MIN/1.73M2
GLUCOSE BLD-MCNC: 89 MG/DL (ref 70–125)
POTASSIUM BLD-SCNC: 4.4 MMOL/L (ref 3.5–5)
SODIUM SERPL-SCNC: 141 MMOL/L (ref 136–145)
URATE SERPL-MCNC: 6.2 MG/DL (ref 3–8)

## 2021-11-24 ASSESSMENT — ASTHMA QUESTIONNAIRES: ACT_TOTALSCORE: 25

## 2022-09-25 ENCOUNTER — HEALTH MAINTENANCE LETTER (OUTPATIENT)
Age: 52
End: 2022-09-25

## 2022-10-16 ENCOUNTER — MYC REFILL (OUTPATIENT)
Dept: FAMILY MEDICINE | Facility: CLINIC | Age: 52
End: 2022-10-16

## 2022-10-16 DIAGNOSIS — R06.2 WHEEZING: ICD-10-CM

## 2022-10-16 DIAGNOSIS — J98.01 BRONCHOSPASM: ICD-10-CM

## 2022-10-17 NOTE — TELEPHONE ENCOUNTER
Outpatient Medication Detail     Disp Refills Start End MANUEL   albuterol (PROAIR HFA;PROVENTIL HFA;VENTOLIN HFA) 90 mcg/actuation inhaler 1 each 0 11/12/2020  No   Sig - Route: Inhale 2 puffs every 6 (six) hours as needed for wheezing. - Inhalation   Sent to pharmacy as: albuterol sulfate HFA 90 mcg/actuation aerosol inhaler (PROAIR HFA;PROVENTIL HFA;VENTOLIN HFA)   Notes to Pharmacy: May substitute the equivalent medication per insurance preference.   E-Prescribing Status: Receipt confirmed by pharmacy (11/12/2020  9:00 AM CST)     albuterol (PROAIR HFA;PROVENTIL HFA;VENTOLIN HFA) 90 mcg/actuation inhaler [[id number]]    Electronically signed by: Abelardo Veliz CNP on 11/12/20 0900 Status: Active   Ordering user: Abelardo Veliz CNP 11/12/20 0900 Authorized by: Abelardo Veliz CNP   PRN reasons: wheezing   Frequency: Q6H PRN 11/12/20 - Until Discontinued   Diagnoses  Wheezing [R06.2]  Bronchospasm [J98.01]   Medication comments: May substitute the equivalent medication per insurance preference.       Outpatient Medication Detail     Disp Refills Start End MANUEL   fluticasone propionate (FLOVENT HFA) 110 mcg/actuation inhaler 1 Inhaler 0 11/12/2020  --   Sig - Route: Inhale 2 puffs 2 (two) times a day. - Inhalation   Sent to pharmacy as: fluticasone propionate 110 mcg/actuation HFA aerosol inhaler (FLOVENT HFA)   E-Prescribing Status: Receipt confirmed by pharmacy (11/12/2020  9:08 AM CST)     fluticasone propionate (FLOVENT HFA) 110 mcg/actuation inhaler [[id number]]    Electronically signed by: Abelardo Veliz CNP on 11/12/20 0908 Status: Active   Ordering user: Abelardo Veliz CNP 11/12/20 0908 Authorized by: Abelardo Veliz CNP   Frequency: BID 11/12/20 - Until Discontinued   Diagnoses  Wheezing [R06.2]     Routing refill request to provider for review/approval because:  A break in medication    Last office visit provider:  11/23/21     Requested Prescriptions   Pending Prescriptions Disp Refills      "albuterol (PROAIR HFA/PROVENTIL HFA/VENTOLIN HFA) 108 (90 Base) MCG/ACT inhaler       Sig: Inhale 2 puffs into the lungs every 6 hours as needed       Asthma Maintenance Inhalers - Anticholinergics Passed - 10/17/2022  1:23 PM        Passed - Patient is age 12 years or older        Passed - Recent (12 mo) or future (30 days) visit within the authorizing provider's specialty     Patient has had an office visit with the authorizing provider or a provider within the authorizing providers department within the previous 12 mos or has a future within next 30 days. See \"Patient Info\" tab in inbasket, or \"Choose Columns\" in Meds & Orders section of the refill encounter.              Passed - Medication is active on med list       Short-Acting Beta Agonist Inhalers Protocol  Passed - 10/17/2022  1:23 PM        Passed - Patient is age 12 or older        Passed - Recent (12 mo) or future (30 days) visit within the authorizing provider's specialty     Patient has had an office visit with the authorizing provider or a provider within the authorizing providers department within the previous 12 mos or has a future within next 30 days. See \"Patient Info\" tab in inbasket, or \"Choose Columns\" in Meds & Orders section of the refill encounter.              Passed - Medication is active on med list           fluticasone (FLOVENT HFA) 110 MCG/ACT inhaler       Sig: Inhale 2 puffs into the lungs       Inhaled Steroids Protocol Passed - 10/16/2022 12:33 PM        Passed - Patient is age 12 or older        Passed - Recent (12 mo) or future (30 days) visit within the authorizing provider's specialty     Patient has had an office visit with the authorizing provider or a provider within the authorizing providers department within the previous 12 mos or has a future within next 30 days. See \"Patient Info\" tab in inbasket, or \"Choose Columns\" in Meds & Orders section of the refill encounter.              Passed - Medication is active on med list "             Mike Russell RN 10/17/22 1:24 PM

## 2022-10-19 RX ORDER — ALBUTEROL SULFATE 90 UG/1
2 AEROSOL, METERED RESPIRATORY (INHALATION) EVERY 6 HOURS PRN
Qty: 18 G | Refills: 0 | Status: SHIPPED | OUTPATIENT
Start: 2022-10-19 | End: 2023-09-06

## 2022-10-19 RX ORDER — FLUTICASONE PROPIONATE 110 UG/1
2 AEROSOL, METERED RESPIRATORY (INHALATION) 2 TIMES DAILY
Qty: 12 G | Refills: 0 | Status: SHIPPED | OUTPATIENT
Start: 2022-10-19 | End: 2024-08-23

## 2022-11-17 ENCOUNTER — OFFICE VISIT (OUTPATIENT)
Dept: FAMILY MEDICINE | Facility: CLINIC | Age: 52
End: 2022-11-17
Payer: COMMERCIAL

## 2022-11-17 VITALS
BODY MASS INDEX: 30.08 KG/M2 | OXYGEN SATURATION: 95 % | HEART RATE: 84 BPM | SYSTOLIC BLOOD PRESSURE: 138 MMHG | DIASTOLIC BLOOD PRESSURE: 98 MMHG | TEMPERATURE: 98.9 F | HEIGHT: 68 IN | WEIGHT: 198.5 LBS

## 2022-11-17 DIAGNOSIS — Z13.220 LIPID SCREENING: ICD-10-CM

## 2022-11-17 DIAGNOSIS — Z11.59 NEED FOR HEPATITIS C SCREENING TEST: ICD-10-CM

## 2022-11-17 DIAGNOSIS — Z13.1 SCREENING FOR DIABETES MELLITUS: ICD-10-CM

## 2022-11-17 DIAGNOSIS — G47.10 HYPERSOMNIA: ICD-10-CM

## 2022-11-17 DIAGNOSIS — R06.2 WHEEZING: ICD-10-CM

## 2022-11-17 DIAGNOSIS — Z12.5 SCREENING FOR PROSTATE CANCER: ICD-10-CM

## 2022-11-17 DIAGNOSIS — Z00.00 ROUTINE GENERAL MEDICAL EXAMINATION AT A HEALTH CARE FACILITY: ICD-10-CM

## 2022-11-17 DIAGNOSIS — R03.0 ELEVATED BLOOD PRESSURE READING WITHOUT DIAGNOSIS OF HYPERTENSION: ICD-10-CM

## 2022-11-17 DIAGNOSIS — Z00.00 ANNUAL PHYSICAL EXAM: Primary | ICD-10-CM

## 2022-11-17 DIAGNOSIS — J01.00 ACUTE NON-RECURRENT MAXILLARY SINUSITIS: ICD-10-CM

## 2022-11-17 LAB
ANION GAP SERPL CALCULATED.3IONS-SCNC: 10 MMOL/L (ref 7–15)
BUN SERPL-MCNC: 12.3 MG/DL (ref 6–20)
CALCIUM SERPL-MCNC: 8.9 MG/DL (ref 8.6–10)
CHLORIDE SERPL-SCNC: 102 MMOL/L (ref 98–107)
CHOLEST SERPL-MCNC: 191 MG/DL
CREAT SERPL-MCNC: 0.86 MG/DL (ref 0.67–1.17)
DEPRECATED HCO3 PLAS-SCNC: 26 MMOL/L (ref 22–29)
GFR SERPL CREATININE-BSD FRML MDRD: >90 ML/MIN/1.73M2
GLUCOSE SERPL-MCNC: 92 MG/DL (ref 70–99)
HDLC SERPL-MCNC: 44 MG/DL
LDLC SERPL CALC-MCNC: 115 MG/DL
NONHDLC SERPL-MCNC: 147 MG/DL
POTASSIUM SERPL-SCNC: 4.4 MMOL/L (ref 3.4–5.3)
PSA SERPL-MCNC: 1.06 NG/ML (ref 0–3.5)
SODIUM SERPL-SCNC: 138 MMOL/L (ref 136–145)
TRIGL SERPL-MCNC: 162 MG/DL

## 2022-11-17 PROCEDURE — 80048 BASIC METABOLIC PNL TOTAL CA: CPT | Performed by: NURSE PRACTITIONER

## 2022-11-17 PROCEDURE — 99396 PREV VISIT EST AGE 40-64: CPT | Performed by: NURSE PRACTITIONER

## 2022-11-17 PROCEDURE — G0103 PSA SCREENING: HCPCS | Performed by: NURSE PRACTITIONER

## 2022-11-17 PROCEDURE — 86803 HEPATITIS C AB TEST: CPT | Performed by: NURSE PRACTITIONER

## 2022-11-17 PROCEDURE — 80061 LIPID PANEL: CPT | Performed by: NURSE PRACTITIONER

## 2022-11-17 PROCEDURE — 36415 COLL VENOUS BLD VENIPUNCTURE: CPT | Performed by: NURSE PRACTITIONER

## 2022-11-17 ASSESSMENT — ENCOUNTER SYMPTOMS
SORE THROAT: 0
NAUSEA: 0
NERVOUS/ANXIOUS: 0
WEAKNESS: 0
HEADACHES: 0
HEMATOCHEZIA: 0
FREQUENCY: 0
JOINT SWELLING: 0
PALPITATIONS: 0
CONSTIPATION: 0
CHILLS: 0
HEMATURIA: 0
DIARRHEA: 0
MYALGIAS: 0
DYSURIA: 0
HEARTBURN: 0
COUGH: 0
SHORTNESS OF BREATH: 0
ARTHRALGIAS: 0
DIZZINESS: 0
FEVER: 0
ABDOMINAL PAIN: 0
EYE PAIN: 0
PARESTHESIAS: 0

## 2022-11-17 ASSESSMENT — ASTHMA QUESTIONNAIRES
QUESTION_4 LAST FOUR WEEKS HOW OFTEN HAVE YOU USED YOUR RESCUE INHALER OR NEBULIZER MEDICATION (SUCH AS ALBUTEROL): ONCE A WEEK OR LESS
ACT_TOTALSCORE: 19
QUESTION_5 LAST FOUR WEEKS HOW WOULD YOU RATE YOUR ASTHMA CONTROL: WELL CONTROLLED
QUESTION_1 LAST FOUR WEEKS HOW MUCH OF THE TIME DID YOUR ASTHMA KEEP YOU FROM GETTING AS MUCH DONE AT WORK, SCHOOL OR AT HOME: A LITTLE OF THE TIME
QUESTION_2 LAST FOUR WEEKS HOW OFTEN HAVE YOU HAD SHORTNESS OF BREATH: ONCE OR TWICE A WEEK
QUESTION_3 LAST FOUR WEEKS HOW OFTEN DID YOUR ASTHMA SYMPTOMS (WHEEZING, COUGHING, SHORTNESS OF BREATH, CHEST TIGHTNESS OR PAIN) WAKE YOU UP AT NIGHT OR EARLIER THAN USUAL IN THE MORNING: ONCE A WEEK
ACT_TOTALSCORE: 19

## 2022-11-17 ASSESSMENT — PAIN SCALES - GENERAL: PAINLEVEL: NO PAIN (0)

## 2022-11-17 NOTE — LETTER
November 18, 2022      Benja Rosas  [id number] [address]        Dear ,    We are writing to inform you of your test results.    Labs are looking good!  Kidneys, blood sugar, electrolytes are fine.  Cholesterol is just a tiny bit elevated but based on the time of the day we marielena it this can be considered normal.  Prostate cancer screen is normal as well    Resulted Orders   Basic metabolic panel   Result Value Ref Range    Sodium 138 136 - 145 mmol/L    Potassium 4.4 3.4 - 5.3 mmol/L    Chloride 102 98 - 107 mmol/L    Carbon Dioxide (CO2) 26 22 - 29 mmol/L    Anion Gap 10 7 - 15 mmol/L    Urea Nitrogen 12.3 6.0 - 20.0 mg/dL    Creatinine 0.86 0.67 - 1.17 mg/dL    Calcium 8.9 8.6 - 10.0 mg/dL    Glucose 92 70 - 99 mg/dL    GFR Estimate >90 >60 mL/min/1.73m2      Comment:      Effective December 21, 2021 eGFRcr in adults is calculated using the 2021 CKD-EPI creatinine equation which includes age and gender (Lilly muro al., NEJ, DOI: 10.1056/TOAJkk8410307)   Lipid panel   Result Value Ref Range    Cholesterol 191 <200 mg/dL    Triglycerides 162 (H) <150 mg/dL    Direct Measure HDL 44 >=40 mg/dL    LDL Cholesterol Calculated 115 (H) <=100 mg/dL    Non HDL Cholesterol 147 (H) <130 mg/dL    Narrative    Cholesterol  Desirable:  <200 mg/dL    Triglycerides  Normal:  Less than 150 mg/dL  Borderline High:  150-199 mg/dL  High:  200-499 mg/dL  Very High:  Greater than or equal to 500 mg/dL    Direct Measure HDL  Female:  Greater than or equal to 50 mg/dL   Male:  Greater than or equal to 40 mg/dL    LDL Cholesterol  Desirable:  <100mg/dL  Above Desirable:  100-129 mg/dL   Borderline High:  130-159 mg/dL   High:  160-189 mg/dL   Very High:  >= 190 mg/dL    Non HDL Cholesterol  Desirable:  130 mg/dL  Above Desirable:  130-159 mg/dL  Borderline High:  160-189 mg/dL  High:  190-219 mg/dL  Very High:  Greater than or equal to 220 mg/dL   PSA, screen   Result Value Ref Range    Prostate Specific Antigen  Screen 1.06 0.00 - 3.50 ng/mL    Narrative    This result is obtained using the Roche Elecsys total PSA method on the kathleen e801 immunoassay analyzer. Results obtained with different assay methods or kits cannot be used interchangeably.       If you have any questions or concerns, please call the clinic at the number listed above.       Sincerely,      Abelardo Veliz NP

## 2022-11-17 NOTE — PATIENT INSTRUCTIONS
Referral placed to sleep medicine.  Contact information is in your paperwork.    Updating screening blood work today    Feel free to double check on the Shingrix vaccine (shingles) to see if it is covered.    Blood pressure is a bit elevated today.  Work on trying to get stress under control and address the possible sleep apnea.  Schedule a nurse only blood pressure check in 6 months so that we can recheck your numbers and make sure that this is coming down.  If still elevated we may recommend medication to help keep this under control.    Preventive Health Recommendations  Male Ages 50 - 64    Yearly exam:             See your health care provider every year in order to  o   Review health changes.   o   Discuss preventive care.    o   Review your medicines if your doctor has prescribed any.   Have a cholesterol test every 5 years, or more frequently if you are at risk for high cholesterol/heart disease.   Have a diabetes test (fasting glucose) every three years. If you are at risk for diabetes, you should have this test more often.   Have a colonoscopy at age 50, or have a yearly FIT test (stool test). These exams will check for colon cancer.    Talk with your health care provider about whether or not a prostate cancer screening test (PSA) is right for you.  You should be tested each year for STDs (sexually transmitted diseases), if you re at risk.     Shots: Get a flu shot each year. Get a tetanus shot every 10 years.     Nutrition:  Eat at least 5 servings of fruits and vegetables daily.   Eat whole-grain bread, whole-wheat pasta and brown rice instead of white grains and rice.   Get adequate Calcium and Vitamin D.     Lifestyle  Exercise for at least 150 minutes a week (30 minutes a day, 5 days a week). This will help you control your weight and prevent disease.   Limit alcohol to one drink per day.   No smoking.   Wear sunscreen to prevent skin cancer.   See your dentist every six months for an exam and  cleaning.   See your eye doctor every 1 to 2 years.

## 2022-11-17 NOTE — LETTER
My Asthma Action Plan    Name: Benja Rosas   YOB: 1970  Date: 11/17/2022   My doctor: Abelardo Veliz NP   My clinic: [facility]        My Control Medicine: INHALED CORTICOSTEROIDS  My Rescue Medicine: Albuterol (Proair/Ventolin/Proventil HFA) 2-4 puffs EVERY 4 HOURS as needed. Use a spacer if recommended by your provider.  My Oral Steroid Medicine: medrol My Asthma Severity:   Moderate Persistent  Know your asthma triggers: uri               GREEN ZONE   Good Control    I feel good    No cough or wheeze    Can work, sleep and play without asthma symptoms       Take your asthma control medicine every day.     1. If exercise triggers your asthma, take your rescue medication    15 minutes before exercise or sports, and    During exercise if you have asthma symptoms  2. Spacer to use with inhaler: If you have a spacer, make sure to use it with your inhaler             YELLOW ZONE Getting Worse  I have ANY of these:    I do not feel good    Cough or wheeze    Chest feels tight    Wake up at night   1. Keep taking your Green Zone medications  2. Start taking your rescue medicine:    every 20 minutes for up to 1 hour. Then every 4 hours for 24-48 hours.  3. If you stay in the Yellow Zone for more than 12-24 hours, contact your doctor.  4. If you do not return to the Green Zone in 12-24 hours or you get worse, start taking your oral steroid medicine if prescribed by your provider.           RED ZONE Medical Alert - Get Help  I have ANY of these:    I feel awful    Medicine is not helping    Breathing getting harder    Trouble walking or talking    Nose opens wide to breathe       1. Take your rescue medicine NOW  2. If your provider has prescribed an oral steroid medicine, start taking it NOW  3. Call your doctor NOW  4. If you are still in the Red Zone after 20 minutes and you have not reached your doctor:    Take your rescue medicine again and    Call 911 or go to the  emergency room right away    See your regular doctor within 2 weeks of an Emergency Room or Urgent Care visit for follow-up treatment.          Annual Reminders:  Meet with Asthma Educator,  Flu Shot in the Fall, consider Pneumonia Vaccination for patients with asthma (aged 19 and older).    Pharmacy: [id number]  [place] [address] [address]  AT [facility] [address]    Electronically signed by Abelardo Veliz NP   Date: 11/17/22                      Asthma Triggers  How To Control Things That Make Your Asthma Worse    Triggers are things that make your asthma worse.  Look at the list below to help you find your triggers and what you can do about them.  You can help prevent asthma flare-ups by staying away from your triggers.      Trigger                                                          What you can do   Cigarette Smoke  Tobacco smoke can make asthma worse. Do not allow smoking in your home, car or around you.  Be sure no one smokes at a child s day care or school.  If you smoke, ask your health care provider for ways to help you quit.  Ask family members to quit too.  Ask your health care provider for a referral to Quit Plan to help you quit smoking, or call 7-032-056-PLAN.     Colds, Flu, Bronchitis  These are common triggers of asthma. Wash your hands often.  Don t touch your eyes, nose or mouth.  Get a flu shot every year.     Dust Mites  These are tiny bugs that live in cloth or carpet. They are too small to see. Wash sheets and blankets in hot water every week.   Encase pillows and mattress in dust mite proof covers.  Avoid having carpet if you can. If you have carpet, vacuum weekly.   Use a dust mask and HEPA vacuum.   Pollen and Outdoor Mold  Some people are allergic to trees, grass, or weed pollen, or molds. Try to keep your windows closed.  Limit time out doors when pollen count is high.   Ask you health care provider about taking medicine during allergy season.     Animal  Dander  Some people are allergic to skin flakes, urine or saliva from pets with fur or feathers. Keep pets with fur or feathers out of your home.    If you can t keep the pet outdoors, then keep the pet out of your bedroom.  Keep the bedroom door closed.  Keep pets off cloth furniture and away from stuffed toys.     Mice, Rats, and Cockroaches   Some people are allergic to the waste from these pests.   Cover food and garbage.  Clean up spills and food crumbs.  Store grease in the refrigerator.   Keep food out of the bedroom.   Indoor Mold  This can be a trigger if your home has high moisture. Fix leaking faucets, pipes, or other sources of water.   Clean moldy surfaces.  Dehumidify basement if it is damp and smelly.   Smoke, Strong Odors, and Sprays  These can reduce air quality. Stay away from strong odors and sprays, such as perfume, powder, hair spray, paints, smoke incense, paint, cleaning products, candles and new carpet.   Exercise or Sports  Some people with asthma have this trigger. Be active!  Ask your doctor about taking medicine before sports or exercise to prevent symptoms.    Warm up for 5-10 minutes before and after sports or exercise.     Other Triggers of Asthma  Cold air:  Cover your nose and mouth with a scarf.  Sometimes laughing or crying can be a trigger.  Some medicines and food can trigger asthma.

## 2022-11-17 NOTE — PROGRESS NOTES
SUBJECTIVE:     CC: Benja is an 52 year old who presents for preventative health visit.     Non fasting physical today.      Dentist noticed quite a bit of tissue in the soft pallet. Will fall asleep on the couch at dinner time.   Does wake with headaches sometimes.  Does struggle with low energy at the end of the night.      Patient has been advised of split billing requirements and indicates understanding: Yes  Healthy Habits:     Getting at least 3 servings of Calcium per day:  NO    Bi-annual eye exam:  Yes    Dental care twice a year:  Yes    Sleep apnea or symptoms of sleep apnea:  Daytime drowsiness    Diet:  Regular (no restrictions)    Frequency of exercise:  2-3 days/week    Duration of exercise:  Less than 15 minutes    Taking medications regularly:  Yes    Medication side effects:  None    PHQ-2 Total Score: 0    Additional concerns today:  No    Today's PHQ-2 Score:   PHQ-2 ( 1999 Pf[employer]) 11/17/2022   Q1: Little interest or pleasure in doing things 0   Q2: Feeling down, depressed or hopeless 0   PHQ-2 Score 0   Q1: Little interest or pleasure in doing things Not at all   Q2: Feeling down, depressed or hopeless Not at all   PHQ-2 Score 0     Have you ever done Advance Care Planning? (For example, a Health Directive, POLST, or a discussion with a medical provider or your loved ones about your wishes):Gave paperwork    Social History     Tobacco Use     Smoking status: Never     Smokeless tobacco: Never   Substance Use Topics     Alcohol use: Yes     If you drink alcohol do you typically have >3 drinks per day or >7 drinks per week? No    Alcohol Use 11/17/2022   Prescreen: >3 drinks/day or >7 drinks/week? No     Last PSA:   Prostate Specific Antigen Screen   Date Value Ref Range Status   09/10/2020 1.6 0.0 - 3.5 ng/mL Final       Reviewed orders with patient. Reviewed health maintenance and updated orders accordingly - Yes  Lab work is in process    Reviewed and updated as needed this visit by clinical  "staff   Tobacco  Allergies  Meds    Surg Hx           Reviewed and updated as needed this visit by Provider        Surg Hx          No past medical history on file.   Past Surgical History:   Procedure Laterality Date     HERNIA REPAIR             OBJECTIVE:   BP (!) 138/98 (BP Location: Right arm, Patient Position: Sitting, Cuff Size: Adult Large)   Pulse 84   Temp 98.9  F (37.2  C)   Ht 1.734 m (5' 8.25\")   Wt 90 kg (198 lb 8 oz)   SpO2 95%   BMI 29.96 kg/m      Physical Exam  GENERAL: healthy, alert and no distress  EYES: Eyes grossly normal to inspection, PERRL and conjunctivae and sclerae normal  HENT: ear canals and TM's normal, nose and mouth without ulcers or lesions  NECK: no adenopathy, no asymmetry, masses, or scars and thyroid normal to palpation  RESP: lungs clear to auscultation - no rales, rhonchi or wheezes  CV: regular rate and rhythm, normal S1 S2, no S3 or S4, no murmur, click or rub, no peripheral edema and peripheral pulses strong  ABDOMEN: soft, nontender, no hepatosplenomegaly, no masses and bowel sounds normal  MS: no gross musculoskeletal defects noted, no edema  SKIN: no suspicious lesions or rashes  NEURO: Normal strength and tone, mentation intact and speech normal  PSYCH: mentation appears normal, affect normal/bright    Diagnostic Test Results:  Labs reviewed in Epic    ASSESSMENT/PLAN:       ICD-10-CM    1. Annual physical exam  Z00.00       2. Hypersomnia  G47.10 Adult Sleep Eval & Management  Referral      3. Elevated blood pressure reading without diagnosis of hypertension  R03.0       4. Need for hepatitis C screening test  Z11.59 Hepatitis C Screen Reflex to HCV RNA Quant and Genotype      5. Lipid screening  Z13.220 Lipid panel      6. Screening for diabetes mellitus  Z13.1 Basic metabolic panel      7. Wheezing  R06.2       8. Acute non-recurrent maxillary sinusitis  J01.00 amoxicillin-clavulanate (AUGMENTIN) 875-125 MG tablet          Update screening labs.  " "We will get on the shot schedule to come in next week for flu, COVID, and he will consider Shingrix.  Referral placed to sleep medicine for possible sleep apnea.  Augmentin to hold onto in case sinusitis does not improve.  Blood pressure elevated today.  Work on lifestyle adjustments and come back in 6 months for nurse blood pressure check to make sure this is at goal    COUNSELING:   Reviewed preventive health counseling, as reflected in patient instructions    BMI:   Estimated body mass index is 29.96 kg/m  as calculated from the following:    Height as of this encounter: 1.734 m (5' 8.25\").    Weight as of this encounter: 90 kg (198 lb 8 oz).     He reports that he has never smoked. He has never used smokeless tobacco.      Abelardo Veliz NP  [facility]  "

## 2022-11-18 LAB — HCV AB SERPL QL IA: NONREACTIVE

## 2022-12-13 ENCOUNTER — IMMUNIZATION (OUTPATIENT)
Dept: NURSING | Facility: CLINIC | Age: 52
End: 2022-12-13
Payer: COMMERCIAL

## 2022-12-13 PROCEDURE — 90471 IMMUNIZATION ADMIN: CPT

## 2022-12-13 PROCEDURE — 90682 RIV4 VACC RECOMBINANT DNA IM: CPT

## 2023-02-20 ENCOUNTER — MYC MEDICAL ADVICE (OUTPATIENT)
Dept: FAMILY MEDICINE | Facility: CLINIC | Age: 53
End: 2023-02-20

## 2023-02-20 ENCOUNTER — VIRTUAL VISIT (OUTPATIENT)
Dept: FAMILY MEDICINE | Facility: CLINIC | Age: 53
End: 2023-02-20
Payer: COMMERCIAL

## 2023-02-20 DIAGNOSIS — J01.10 ACUTE NON-RECURRENT FRONTAL SINUSITIS: Primary | ICD-10-CM

## 2023-02-20 PROCEDURE — 99213 OFFICE O/P EST LOW 20 MIN: CPT | Mod: VID | Performed by: FAMILY MEDICINE

## 2023-02-20 RX ORDER — DOXYCYCLINE HYCLATE 100 MG
100 TABLET ORAL 2 TIMES DAILY
Qty: 20 TABLET | Refills: 0 | Status: SHIPPED | OUTPATIENT
Start: 2023-02-20 | End: 2023-09-06

## 2023-02-20 ASSESSMENT — ASTHMA QUESTIONNAIRES
QUESTION_3 LAST FOUR WEEKS HOW OFTEN DID YOUR ASTHMA SYMPTOMS (WHEEZING, COUGHING, SHORTNESS OF BREATH, CHEST TIGHTNESS OR PAIN) WAKE YOU UP AT NIGHT OR EARLIER THAN USUAL IN THE MORNING: NOT AT ALL
ACT_TOTALSCORE: 23
QUESTION_4 LAST FOUR WEEKS HOW OFTEN HAVE YOU USED YOUR RESCUE INHALER OR NEBULIZER MEDICATION (SUCH AS ALBUTEROL): NOT AT ALL
QUESTION_1 LAST FOUR WEEKS HOW MUCH OF THE TIME DID YOUR ASTHMA KEEP YOU FROM GETTING AS MUCH DONE AT WORK, SCHOOL OR AT HOME: NONE OF THE TIME
QUESTION_5 LAST FOUR WEEKS HOW WOULD YOU RATE YOUR ASTHMA CONTROL: WELL CONTROLLED
QUESTION_2 LAST FOUR WEEKS HOW OFTEN HAVE YOU HAD SHORTNESS OF BREATH: ONCE OR TWICE A WEEK
ACT_TOTALSCORE: 23

## 2023-02-20 NOTE — PROGRESS NOTES
"Benja is a 52 year old who is being evaluated via a billable video visit.      How would you like to obtain your AVS? MyChart  If the video visit is dropped, the invitation should be resent by: Text to cell phone: [phone]  Will anyone else be joining your video visit? No          Assessment & Plan   Problem List Items Addressed This Visit    None  Visit Diagnoses     Acute non-recurrent frontal sinusitis    -  Primary    Relevant Medications    doxycycline hyclate (VIBRA-TABS) 100 MG tablet         Return to clinic if symptoms worsen or do not improve             BMI:   Estimated body mass index is 29.96 kg/m  as calculated from the following:    Height as of 11/17/22: 1.734 m (5' 8.25\").    Weight as of 11/17/22: 90 kg (198 lb 8 oz).           No follow-ups on file.    Becky Hagan MD  [facility]    Subjective   Benja is a 52 year old, presenting for the following health issues:  Sinus Problem (Pt c/o L sided sinus congestion x 2 weeks. He has used sudafed which helps but sinus still feel full of pressure. He states he has a hx of sinus infections and is hoping to get an antibiotic . )      History of Present Illness       Reason for visit:  Sinus infection  Symptom onset:  1-2 weeks ago  Symptoms include:  Head pain congestion  Symptom intensity:  Severe  Symptom progression:  Worsening  Had these symptoms before:  Yes  Has tried/received treatment for these symptoms:  Yes  Previous treatment was successful:  Yes  Prior treatment description:  AntibioticsHe consumes 4 sweetened beverage(s) daily. He exercises with enough effort to increase his heart rate 3 or less days per week.   He is taking medications regularly.     Now having secondary sickening left facial pain, sudafed not really helping, hot showers are helpful        Review of Systems         Objective           Vitals:  No vitals were obtained today due to virtual visit.    Physical Exam   GENERAL: Healthy, alert " and no distress  EYES: Eyes grossly normal to inspection.  No discharge or erythema, or obvious scleral/conjunctival abnormalities.  RESP: No audible wheeze, cough, or visible cyanosis.  No visible retractions or increased work of breathing.    SKIN: Visible skin clear. No significant rash, abnormal pigmentation or lesions.  NEURO: Cranial nerves grossly intact.  Mentation and speech appropriate for age.  PSYCH: Mentation appears normal, affect normal/bright, judgement and insight intact, normal speech and appearance well-groomed.                Video-Visit Details    Type of service:  Video Visit     Originating Location (pt. Location): Home    Distant Location (provider location):  On-site  Platform used for Video Visit: Kalyn

## 2023-08-27 ENCOUNTER — OFFICE VISIT (OUTPATIENT)
Dept: FAMILY MEDICINE | Facility: CLINIC | Age: 53
End: 2023-08-27
Payer: COMMERCIAL

## 2023-08-27 VITALS
OXYGEN SATURATION: 95 % | SYSTOLIC BLOOD PRESSURE: 152 MMHG | DIASTOLIC BLOOD PRESSURE: 109 MMHG | RESPIRATION RATE: 16 BRPM | HEART RATE: 83 BPM | TEMPERATURE: 97.9 F

## 2023-08-27 DIAGNOSIS — J45.21 MILD INTERMITTENT ASTHMA WITH EXACERBATION: Primary | ICD-10-CM

## 2023-08-27 PROCEDURE — 99213 OFFICE O/P EST LOW 20 MIN: CPT | Performed by: PHYSICIAN ASSISTANT

## 2023-08-27 RX ORDER — ALBUTEROL SULFATE 90 UG/1
2 AEROSOL, METERED RESPIRATORY (INHALATION) EVERY 6 HOURS PRN
Qty: 18 G | Refills: 1 | Status: SHIPPED | OUTPATIENT
Start: 2023-08-27 | End: 2023-09-06

## 2023-08-27 RX ORDER — ALBUTEROL SULFATE 90 UG/1
2 AEROSOL, METERED RESPIRATORY (INHALATION) EVERY 6 HOURS PRN
Qty: 18 G | Refills: 1 | Status: SHIPPED | OUTPATIENT
Start: 2023-08-27 | End: 2024-08-23

## 2023-08-27 RX ORDER — PREDNISONE 20 MG/1
40 TABLET ORAL DAILY
Qty: 10 TABLET | Refills: 0 | Status: SHIPPED | OUTPATIENT
Start: 2023-08-27 | End: 2023-09-01

## 2023-08-27 NOTE — PROGRESS NOTES
"  Assessment & Plan:      Problem List Items Addressed This Visit    None  Visit Diagnoses       Mild intermittent asthma with exacerbation    -  Primary    Relevant Medications    albuterol (PROAIR HFA/PROVENTIL HFA/VENTOLIN HFA) 108 (90 Base) MCG/ACT inhaler    albuterol (PROAIR HFA/PROVENTIL HFA/VENTOLIN HFA) 108 (90 Base) MCG/ACT inhaler    predniSONE (DELTASONE) 20 MG tablet          Medical Decision Making  Patient presents with cough, nasal congestion, and chest tightness for 5 days.  Symptoms appear consistent with an asthma exacerbation.  Recommend albuterol inhaler and short course of oral steroids.  Otherwise, no signs of respiratory distress.  Discussed treatment and symptomatic care.  Allergies and medication interactions reviewed.  Discussed signs of worsening symptoms and when to follow-up with PCP if no symptom improvement.     Subjective:      Benja Rosas is a 52 year old male with history of allergies and asthma, here for evaluation of cough, nasal congestion, and chest tightness.  Onset of symptoms was 5 days ago.  Patient denies fevers.  No other sore throat.  Patient has had the symptoms before and gets good relief with oral prednisone and albuterol inhaler.     The following portions of the patient's history were reviewed and updated as appropriate: allergies, current medications, and problem list.     Review of Systems  Pertinent items are noted in HPI.    Allergies  Allergies   Allergen Reactions    Ciprofloxacin Unknown       Family History   Problem Relation Age of Onset    Heart Failure Mother     Asthma Mother     Lung Cancer Father     Other - See Comments Sister         \"numerous health issues\"    No Known Problems Brother        Social History     Tobacco Use    Smoking status: Never    Smokeless tobacco: Never   Substance Use Topics    Alcohol use: Yes        Objective:      BP (!) 152/109   Pulse 83   Temp 97.9  F (36.6  C) (Oral)   Resp 16   SpO2 95%   General appearance - " alert, well appearing, and in no distress and non-toxic  Ears - bilateral TM's and external ear canals normal  Nose - normal and patent, no erythema, discharge or polyps  Mouth - mucous membranes moist, pharynx normal without lesions  Neck - supple, no significant adenopathy  Chest - clear to auscultation, no wheezes, rales or rhonchi, symmetric air entry  Heart - normal rate, regular rhythm, normal S1, S2, no murmurs, rubs, clicks or gallops    The use of Dragon/[employer] dictation services was used to construct the content of this note; any grammatical errors are non-intentional. Please contact the author directly if you are in need of any clarification.      Depression

## 2023-09-06 ENCOUNTER — OFFICE VISIT (OUTPATIENT)
Dept: FAMILY MEDICINE | Facility: CLINIC | Age: 53
End: 2023-09-06
Payer: COMMERCIAL

## 2023-09-06 VITALS
OXYGEN SATURATION: 95 % | DIASTOLIC BLOOD PRESSURE: 104 MMHG | TEMPERATURE: 99.2 F | BODY MASS INDEX: 29.25 KG/M2 | HEART RATE: 89 BPM | HEIGHT: 68 IN | RESPIRATION RATE: 16 BRPM | WEIGHT: 193 LBS | SYSTOLIC BLOOD PRESSURE: 152 MMHG

## 2023-09-06 DIAGNOSIS — J01.10 ACUTE NON-RECURRENT FRONTAL SINUSITIS: Primary | ICD-10-CM

## 2023-09-06 PROCEDURE — 99213 OFFICE O/P EST LOW 20 MIN: CPT | Performed by: FAMILY MEDICINE

## 2023-09-06 ASSESSMENT — PAIN SCALES - GENERAL: PAINLEVEL: MILD PAIN (3)

## 2023-09-06 ASSESSMENT — ENCOUNTER SYMPTOMS
RHINORRHEA: 1
FATIGUE: 1
SINUS PAIN: 1
HEADACHES: 1
WHEEZING: 1
SHORTNESS OF BREATH: 1
COUGH: 1
SINUS PRESSURE: 1

## 2023-09-06 ASSESSMENT — ASTHMA QUESTIONNAIRES: ACT_TOTALSCORE: 11

## 2023-09-06 NOTE — PROGRESS NOTES
"  Assessment & Plan   Problem List Items Addressed This Visit    None  Visit Diagnoses       Acute non-recurrent frontal sinusitis    -  Primary           Augmentin prescribed.  Discussed with patient symptomatic management.  If no improvement, will contact us.  If any other issues also to contact us.  Patient agreeable to plan.           BMI:   Estimated body mass index is 29.13 kg/m  as calculated from the following:    Height as of this encounter: 1.734 m (5' 8.25\").    Weight as of this encounter: 87.5 kg (193 lb).           BRIAN CAHUDHARY MD  [facility]    Ann Marie Yousif is a 52 year old, presenting for the following health issues:  Sinus Problem      9/6/2023     9:39 AM   Additional Questions   Roomed by Sue MO CMA       Sinus Problem   The pain is at a severity of 3/10. Associated symptoms include congestion, sinus pressure, cough and shortness of breath.   History of Present Illness       Reason for visit:  Sinus infection    He eats 0-1 servings of fruits and vegetables daily.He consumes 4 sweetened beverage(s) daily.He exercises with enough effort to increase his heart rate 9 or less minutes per day.  He exercises with enough effort to increase his heart rate 5 days per week.   He is taking medications regularly.               Review of Systems   Constitutional:  Positive for fatigue.   HENT:  Positive for congestion, postnasal drip, rhinorrhea, sinus pressure and sinus pain.    Respiratory:  Positive for cough, shortness of breath and wheezing.    Neurological:  Positive for headaches.      Constitutional, HEENT, cardiovascular, pulmonary, gi and gu systems are negative, except as otherwise noted.      Objective    BP (!) 156/110   Pulse 89   Temp 99.2  F (37.3  C) (Temporal)   Resp 16   Ht 1.734 m (5' 8.25\")   Wt 87.5 kg (193 lb)   SpO2 95%   BMI 29.13 kg/m    Body mass index is 29.13 kg/m .  Physical Exam   GENERAL: healthy, alert and no distress  HENT: normal " cephalic/atraumatic, ear canals and TM's normal, nose and mouth without ulcers or lesions, nasal mucosa edematous , rhinorrhea yellow, and oral mucous membranes moist  NECK: no adenopathy, no asymmetry, masses, or scars and thyroid normal to palpation  RESP: lungs clear to auscultation - no rales, rhonchi or wheezes  CV: regular rate and rhythm, normal S1 S2, no S3 or S4, no murmur, click or rub, no peripheral edema and peripheral pulses strong  ABDOMEN: soft, nontender, no hepatosplenomegaly, no masses and bowel sounds normal

## 2023-09-13 ENCOUNTER — TELEPHONE (OUTPATIENT)
Dept: FAMILY MEDICINE | Facility: CLINIC | Age: 53
End: 2023-09-13
Payer: COMMERCIAL

## 2023-09-13 NOTE — TELEPHONE ENCOUNTER
Reason for Call:  Other appointment    Detailed comments Patient has been seen for the allergy/ sinus issues and has been put on antibiotics and is still feeling not well. Can't get virtul till the end of the month. Would like to talk to my Dr or Dr Wilkins who gave me medication    Phone Number Patient can be reached at: Cell number on file:    Telephone Information:   Mobile [phone]       Best Time: anytime    Can we leave a detailed message on this number? YES    Call taken on 9/13/2023 at 4:02 PM by CORIE SNELL

## 2023-09-14 ENCOUNTER — VIRTUAL VISIT (OUTPATIENT)
Dept: FAMILY MEDICINE | Facility: CLINIC | Age: 53
End: 2023-09-14
Payer: COMMERCIAL

## 2023-09-14 DIAGNOSIS — J01.01 ACUTE RECURRENT MAXILLARY SINUSITIS: Primary | ICD-10-CM

## 2023-09-14 PROCEDURE — 99213 OFFICE O/P EST LOW 20 MIN: CPT | Mod: 93 | Performed by: NURSE PRACTITIONER

## 2023-09-14 RX ORDER — FLUTICASONE PROPIONATE 50 MCG
1 SPRAY, SUSPENSION (ML) NASAL DAILY
Qty: 16 G | Refills: 11 | Status: SHIPPED | OUTPATIENT
Start: 2023-09-14

## 2023-09-14 RX ORDER — DOXYCYCLINE 100 MG/1
100 CAPSULE ORAL 2 TIMES DAILY
Qty: 20 CAPSULE | Refills: 0 | Status: SHIPPED | OUTPATIENT
Start: 2023-09-14 | End: 2024-08-23

## 2023-09-14 RX ORDER — PREDNISONE 10 MG/1
TABLET ORAL
Qty: 30 TABLET | Refills: 0 | Status: SHIPPED | OUTPATIENT
Start: 2023-09-14 | End: 2024-08-23

## 2023-09-14 NOTE — TELEPHONE ENCOUNTER
Was seen by Dr. Wilkins on 9/6/23.    Sinus pressure/pain/drainage cause a cough. Pt states he did feel better for about 2 days but still has sx. Requesting prednisone and referral.    PCP verbally notified and stated he would be avalible right now for a phone visit if the pt was. PT agreed.

## 2023-09-14 NOTE — PROGRESS NOTES
"Benja is a 52 year old who is being evaluated via a billable telephone visit.      What phone number would you like to be contacted at? [phone]  How would you like to obtain your AVS? Ana    Distant Location (provider location):  On-site    Assessment & Plan     Persistent sinusitis symptoms.  Previously responded well to doxycycline.  This was sent to the pharmacy along with another round of steroids and fluticasone nasal spray.  Reviewed potential side effects.  If failing to improve as anticipated, let me know.    Acute recurrent maxillary sinusitis  - doxycycline hyclate (VIBRAMYCIN) 100 MG capsule; Take 1 capsule (100 mg) by mouth 2 times daily  - predniSONE (DELTASONE) 10 MG tablet; 4 tabs for 3 days. 3 tabs for 3 days. 2 tabs for 3 days. 1 tab for 3 days.  - fluticasone (FLONASE) 50 MCG/ACT nasal spray; Spray 1 spray into both nostrils daily    Reviewed family medicine note x3     BMI:   Estimated body mass index is 29.13 kg/m  as calculated from the following:    Height as of 9/6/23: 1.734 m (5' 8.25\").    Weight as of 9/6/23: 87.5 kg (193 lb).       Abelardo Veliz NP  [facility]   Benja is a 52 year old, presenting for the following health issues:  Follow Up ([place] 9/6/23- Sinus pressure/pain/drainage cause a cough. Pt states he did feel better for about 2 days but still has sx. Requesting prednisone and referral.)    History of Present Illness       Reason for visit:  Sinus infection    He eats 0-1 servings of fruits and vegetables daily.He consumes 4 sweetened beverage(s) daily.He exercises with enough effort to increase his heart rate 9 or less minutes per day.  He exercises with enough effort to increase his heart rate 5 days per week.   He is taking medications regularly.     Sinusitis  Started around the end of July. Sinuses congested.  Cough. Getting tons of mucous.  Got prednisone at the first visit.  Seemed to help.  Girlfriend then got a URI and " Benja then got sick. Started to get yellow brown mucous.  Got augmentin.  Travelled to [facility] through this.  Feeling fatigued.  Not sleeping well due to congestion.  Started neti pot recently.  Chest is achey from coughing. Can pass air through the sinuses but has pain/burning along the maxillary sinuses.  Tested negative for covid x2 at home.  Has done allergy testing in 2020.  Not using fluticasone nasal spray.      Review of Systems   Constitutional, HEENT, cardiovascular, pulmonary, gi and gu systems are negative, except as otherwise noted.      Objective           Vitals:  No vitals were obtained today due to virtual visit.    Physical Exam   healthy, alert, and no distress  PSYCH: Alert and oriented times 3; coherent speech, normal   rate and volume, able to articulate logical thoughts, able   to abstract reason, no tangential thoughts, no hallucinations   or delusions  His affect is normal  RESP: No cough, no audible wheezing, able to talk in full sentences  Remainder of exam unable to be completed due to telephone visits          Phone call duration: 12 minutes

## 2023-10-18 ENCOUNTER — PATIENT OUTREACH (OUTPATIENT)
Dept: CARE COORDINATION | Facility: CLINIC | Age: 53
End: 2023-10-18
Payer: COMMERCIAL

## 2023-11-01 ENCOUNTER — PATIENT OUTREACH (OUTPATIENT)
Dept: CARE COORDINATION | Facility: CLINIC | Age: 53
End: 2023-11-01
Payer: COMMERCIAL

## 2023-12-23 ENCOUNTER — HEALTH MAINTENANCE LETTER (OUTPATIENT)
Age: 53
End: 2023-12-23

## 2024-05-15 ENCOUNTER — PATIENT OUTREACH (OUTPATIENT)
Dept: CARE COORDINATION | Facility: CLINIC | Age: 54
End: 2024-05-15
Payer: COMMERCIAL

## 2024-07-19 ENCOUNTER — OFFICE VISIT (OUTPATIENT)
Dept: FAMILY MEDICINE | Facility: CLINIC | Age: 54
End: 2024-07-19
Payer: COMMERCIAL

## 2024-07-19 VITALS
SYSTOLIC BLOOD PRESSURE: 152 MMHG | RESPIRATION RATE: 16 BRPM | TEMPERATURE: 98.1 F | HEART RATE: 62 BPM | WEIGHT: 203.7 LBS | HEIGHT: 68 IN | BODY MASS INDEX: 30.87 KG/M2 | OXYGEN SATURATION: 97 % | DIASTOLIC BLOOD PRESSURE: 103 MMHG

## 2024-07-19 DIAGNOSIS — M77.12 LATERAL EPICONDYLITIS OF LEFT ELBOW: Primary | ICD-10-CM

## 2024-07-19 PROCEDURE — 99213 OFFICE O/P EST LOW 20 MIN: CPT | Performed by: FAMILY MEDICINE

## 2024-07-19 ASSESSMENT — ASTHMA QUESTIONNAIRES
QUESTION_3 LAST FOUR WEEKS HOW OFTEN DID YOUR ASTHMA SYMPTOMS (WHEEZING, COUGHING, SHORTNESS OF BREATH, CHEST TIGHTNESS OR PAIN) WAKE YOU UP AT NIGHT OR EARLIER THAN USUAL IN THE MORNING: TWO OR THREE NIGHTS A WEEK
ACT_TOTALSCORE: 15
QUESTION_1 LAST FOUR WEEKS HOW MUCH OF THE TIME DID YOUR ASTHMA KEEP YOU FROM GETTING AS MUCH DONE AT WORK, SCHOOL OR AT HOME: A LITTLE OF THE TIME
QUESTION_4 LAST FOUR WEEKS HOW OFTEN HAVE YOU USED YOUR RESCUE INHALER OR NEBULIZER MEDICATION (SUCH AS ALBUTEROL): TWO OR THREE TIMES PER WEEK
QUESTION_5 LAST FOUR WEEKS HOW WOULD YOU RATE YOUR ASTHMA CONTROL: SOMEWHAT CONTROLLED
ACT_TOTALSCORE: 15
QUESTION_2 LAST FOUR WEEKS HOW OFTEN HAVE YOU HAD SHORTNESS OF BREATH: THREE TO SIX TIMES A WEEK

## 2024-07-19 NOTE — PROGRESS NOTES
"  Assessment & Plan     Lateral epicondylitis of left elbow  Rice tennis elbow strap Stretch  Needs bp fu        BMI  Estimated body mass index is 30.75 kg/m  as calculated from the following:    Height as of this encounter: 1.734 m (5' 8.25\").    Weight as of this encounter: 92.4 kg (203 lb 11.2 oz).             Ann Marie Yousif is a 53 year old, presenting for the following health issues:  Arm Pain (Left arm pain x 2 months - Injured by lifting heavy doors)      7/19/2024     7:17 AM   Additional Questions   Roomed by Amanda     Arm Pain    History of Present Illness       Reason for visit:  Hurt arm  Symptom onset:  More than a month  Symptoms include:  Pain elbow  Symptom intensity:  Moderate  Symptom progression:  Staying the same  Had these symptoms before:  No    He eats 0-1 servings of fruits and vegetables daily.He consumes 4 sweetened beverage(s) daily.He exercises with enough effort to increase his heart rate 9 or less minutes per day.  He exercises with enough effort to increase his heart rate 3 or less days per week.   He is taking medications regularly.                 Review of Systems  Constitutional, HEENT, cardiovascular, pulmonary, gi and gu systems are negative, except as otherwise noted.      Objective    BP (!) 152/103 (BP Location: Right arm, Patient Position: Sitting, Cuff Size: Adult Large)   Pulse 62   Temp 98.1  F (36.7  C) (Oral)   Resp 16   Ht 1.734 m (5' 8.25\")   Wt 92.4 kg (203 lb 11.2 oz)   SpO2 97%   BMI 30.75 kg/m    Body mass index is 30.75 kg/m .  Physical Exam   Left elbow tender over lateral epicondyle left ue cms otherwise intact            Signed Electronically by: Danyel Encarnacion MD    "

## 2024-08-19 SDOH — HEALTH STABILITY: PHYSICAL HEALTH: ON AVERAGE, HOW MANY MINUTES DO YOU ENGAGE IN EXERCISE AT THIS LEVEL?: 20 MIN

## 2024-08-19 SDOH — HEALTH STABILITY: PHYSICAL HEALTH: ON AVERAGE, HOW MANY DAYS PER WEEK DO YOU ENGAGE IN MODERATE TO STRENUOUS EXERCISE (LIKE A BRISK WALK)?: 1 DAY

## 2024-08-19 ASSESSMENT — SOCIAL DETERMINANTS OF HEALTH (SDOH): HOW OFTEN DO YOU GET TOGETHER WITH FRIENDS OR RELATIVES?: ONCE A WEEK

## 2024-08-23 ENCOUNTER — OFFICE VISIT (OUTPATIENT)
Dept: FAMILY MEDICINE | Facility: CLINIC | Age: 54
End: 2024-08-23
Payer: COMMERCIAL

## 2024-08-23 VITALS
HEIGHT: 69 IN | WEIGHT: 200.4 LBS | HEART RATE: 73 BPM | BODY MASS INDEX: 29.68 KG/M2 | RESPIRATION RATE: 18 BRPM | OXYGEN SATURATION: 98 % | SYSTOLIC BLOOD PRESSURE: 130 MMHG | TEMPERATURE: 98.2 F | DIASTOLIC BLOOD PRESSURE: 80 MMHG

## 2024-08-23 DIAGNOSIS — R06.2 WHEEZING: ICD-10-CM

## 2024-08-23 DIAGNOSIS — Z12.5 SCREENING FOR PROSTATE CANCER: ICD-10-CM

## 2024-08-23 DIAGNOSIS — Z13.1 SCREENING FOR DIABETES MELLITUS: ICD-10-CM

## 2024-08-23 DIAGNOSIS — J45.30 MILD PERSISTENT ASTHMA WITHOUT COMPLICATION: ICD-10-CM

## 2024-08-23 DIAGNOSIS — Z00.00 ROUTINE GENERAL MEDICAL EXAMINATION AT A HEALTH CARE FACILITY: Primary | ICD-10-CM

## 2024-08-23 DIAGNOSIS — J45.21 MILD INTERMITTENT ASTHMA WITH EXACERBATION: ICD-10-CM

## 2024-08-23 DIAGNOSIS — Z13.220 LIPID SCREENING: ICD-10-CM

## 2024-08-23 LAB
ALBUMIN SERPL BCG-MCNC: 4.6 G/DL (ref 3.5–5.2)
ALP SERPL-CCNC: 70 U/L (ref 40–150)
ALT SERPL W P-5'-P-CCNC: 14 U/L (ref 0–70)
ANION GAP SERPL CALCULATED.3IONS-SCNC: 10 MMOL/L (ref 7–15)
AST SERPL W P-5'-P-CCNC: 20 U/L (ref 0–45)
BILIRUB SERPL-MCNC: 0.3 MG/DL
BUN SERPL-MCNC: 9.2 MG/DL (ref 6–20)
CALCIUM SERPL-MCNC: 9.4 MG/DL (ref 8.8–10.4)
CHLORIDE SERPL-SCNC: 104 MMOL/L (ref 98–107)
CHOLEST SERPL-MCNC: 198 MG/DL
CREAT SERPL-MCNC: 0.88 MG/DL (ref 0.67–1.17)
EGFRCR SERPLBLD CKD-EPI 2021: >90 ML/MIN/1.73M2
FASTING STATUS PATIENT QL REPORTED: NO
FASTING STATUS PATIENT QL REPORTED: NO
GLUCOSE SERPL-MCNC: 88 MG/DL (ref 70–99)
HBA1C MFR BLD: 6 % (ref 0–5.6)
HCO3 SERPL-SCNC: 28 MMOL/L (ref 22–29)
HDLC SERPL-MCNC: 40 MG/DL
LDLC SERPL CALC-MCNC: 110 MG/DL
NONHDLC SERPL-MCNC: 158 MG/DL
POTASSIUM SERPL-SCNC: 4.4 MMOL/L (ref 3.4–5.3)
PROT SERPL-MCNC: 7.3 G/DL (ref 6.4–8.3)
PSA SERPL DL<=0.01 NG/ML-MCNC: 1.5 NG/ML (ref 0–3.5)
SODIUM SERPL-SCNC: 142 MMOL/L (ref 135–145)
TRIGL SERPL-MCNC: 239 MG/DL

## 2024-08-23 PROCEDURE — 99396 PREV VISIT EST AGE 40-64: CPT | Performed by: NURSE PRACTITIONER

## 2024-08-23 PROCEDURE — G0103 PSA SCREENING: HCPCS | Performed by: NURSE PRACTITIONER

## 2024-08-23 PROCEDURE — 83036 HEMOGLOBIN GLYCOSYLATED A1C: CPT | Performed by: NURSE PRACTITIONER

## 2024-08-23 PROCEDURE — 80053 COMPREHEN METABOLIC PANEL: CPT | Performed by: NURSE PRACTITIONER

## 2024-08-23 PROCEDURE — 80061 LIPID PANEL: CPT | Performed by: NURSE PRACTITIONER

## 2024-08-23 PROCEDURE — 36415 COLL VENOUS BLD VENIPUNCTURE: CPT | Performed by: NURSE PRACTITIONER

## 2024-08-23 RX ORDER — ALBUTEROL SULFATE 90 UG/1
2 AEROSOL, METERED RESPIRATORY (INHALATION) EVERY 6 HOURS PRN
Qty: 36 G | Refills: 1 | Status: SHIPPED | OUTPATIENT
Start: 2024-08-23

## 2024-08-23 RX ORDER — FLUTICASONE PROPIONATE 110 UG/1
1 AEROSOL, METERED RESPIRATORY (INHALATION) 2 TIMES DAILY
Qty: 12 G | Refills: 3 | Status: SHIPPED | OUTPATIENT
Start: 2024-08-23

## 2024-08-23 ASSESSMENT — PAIN SCALES - GENERAL: PAINLEVEL: NO PAIN (0)

## 2024-08-23 ASSESSMENT — ASTHMA QUESTIONNAIRES
QUESTION_1 LAST FOUR WEEKS HOW MUCH OF THE TIME DID YOUR ASTHMA KEEP YOU FROM GETTING AS MUCH DONE AT WORK, SCHOOL OR AT HOME: A LITTLE OF THE TIME
QUESTION_4 LAST FOUR WEEKS HOW OFTEN HAVE YOU USED YOUR RESCUE INHALER OR NEBULIZER MEDICATION (SUCH AS ALBUTEROL): ONCE A WEEK OR LESS
ACT_TOTALSCORE: 20
QUESTION_3 LAST FOUR WEEKS HOW OFTEN DID YOUR ASTHMA SYMPTOMS (WHEEZING, COUGHING, SHORTNESS OF BREATH, CHEST TIGHTNESS OR PAIN) WAKE YOU UP AT NIGHT OR EARLIER THAN USUAL IN THE MORNING: ONCE OR TWICE
ACT_TOTALSCORE: 20
QUESTION_2 LAST FOUR WEEKS HOW OFTEN HAVE YOU HAD SHORTNESS OF BREATH: ONCE OR TWICE A WEEK
QUESTION_5 LAST FOUR WEEKS HOW WOULD YOU RATE YOUR ASTHMA CONTROL: WELL CONTROLLED

## 2024-08-23 NOTE — LETTER
My Asthma Action Plan    Name: Benja Rosas   YOB: 1970  Date: 8/23/2024   My doctor: Abelardo Veliz NP   My clinic: [facility]        My Control Medicine: INHALED CORTICOSTEROIDS  My Rescue Medicine: Albuterol (Proair/Ventolin/Proventil HFA) 2-4 puffs EVERY 4 HOURS as needed. Use a spacer if recommended by your provider.  medrol My Asthma Severity:   Mild Persistent  Know your asthma triggers:                  GREEN ZONE   Good Control  I feel good  No cough or wheeze  Can work, sleep and play without asthma symptoms       Take your asthma control medicine every day.     If exercise triggers your asthma, take your rescue medication  15 minutes before exercise or sports, and  During exercise if you have asthma symptoms  Spacer to use with inhaler: If you have a spacer, make sure to use it with your inhaler             YELLOW ZONE Getting Worse  I have ANY of these:  I do not feel good  Cough or wheeze  Chest feels tight  Wake up at night   Keep taking your Green Zone medications  Start taking your rescue medicine:  every 20 minutes for up to 1 hour. Then every 4 hours for 24-48 hours.  If you stay in the Yellow Zone for more than 12-24 hours, contact your doctor.  If you do not return to the Green Zone in 12-24 hours or you get worse, start taking your oral steroid medicine if prescribed by your provider.           RED ZONE Medical Alert - Get Help  I have ANY of these:  I feel awful  Medicine is not helping  Breathing getting harder  Trouble walking or talking  Nose opens wide to breathe       Take your rescue medicine NOW  If your provider has prescribed an oral steroid medicine, start taking it NOW  Call your doctor NOW  If you are still in the Red Zone after 20 minutes and you have not reached your doctor:  Take your rescue medicine again and  Call 911 or go to the emergency room right away    See your regular doctor within 2 weeks of an Emergency Room or Urgent  Care visit for follow-up treatment.          Annual Reminders:  Meet with Asthma Educator,  Flu Shot in the Fall, consider Pneumonia Vaccination for patients with asthma (aged 19 and older).    Pharmacy: [id number]  [place], [place] - [address]  AT [facility] [address]    Electronically signed by Abelardo Veliz NP   Date: 08/23/24                      Asthma Triggers  How To Control Things That Make Your Asthma Worse    Triggers are things that make your asthma worse.  Look at the list below to help you find your triggers and what you can do about them.  You can help prevent asthma flare-ups by staying away from your triggers.      Trigger                                                          What you can do   Cigarette Smoke  Tobacco smoke can make asthma worse. Do not allow smoking in your home, car or around you.  Be sure no one smokes at a child s day care or school.  If you smoke, ask your health care provider for ways to help you quit.  Ask family members to quit too.  Ask your health care provider for a referral to Quit Plan to help you quit smoking, or call 6-185-433-PLAN.     Colds, Flu, Bronchitis  These are common triggers of asthma. Wash your hands often.  Don t touch your eyes, nose or mouth.  Get a flu shot every year.     Dust Mites  These are tiny bugs that live in cloth or carpet. They are too small to see. Wash sheets and blankets in hot water every week.   Encase pillows and mattress in dust mite proof covers.  Avoid having carpet if you can. If you have carpet, vacuum weekly.   Use a dust mask and HEPA vacuum.   Pollen and Outdoor Mold  Some people are allergic to trees, grass, or weed pollen, or molds. Try to keep your windows closed.  Limit time out doors when pollen count is high.   Ask you health care provider about taking medicine during allergy season.     Animal Dander  Some people are allergic to skin flakes, urine or saliva from pets with fur or feathers.  Keep pets with fur or feathers out of your home.    If you can t keep the pet outdoors, then keep the pet out of your bedroom.  Keep the bedroom door closed.  Keep pets off cloth furniture and away from stuffed toys.     Mice, Rats, and Cockroaches   Some people are allergic to the waste from these pests.   Cover food and garbage.  Clean up spills and food crumbs.  Store grease in the refrigerator.   Keep food out of the bedroom.   Indoor Mold  This can be a trigger if your home has high moisture. Fix leaking faucets, pipes, or other sources of water.   Clean moldy surfaces.  Dehumidify basement if it is damp and smelly.   Smoke, Strong Odors, and Sprays  These can reduce air quality. Stay away from strong odors and sprays, such as perfume, powder, hair spray, paints, smoke incense, paint, cleaning products, candles and new carpet.   Exercise or Sports  Some people with asthma have this trigger. Be active!  Ask your doctor about taking medicine before sports or exercise to prevent symptoms.    Warm up for 5-10 minutes before and after sports or exercise.     Other Triggers of Asthma  Cold air:  Cover your nose and mouth with a scarf.  Sometimes laughing or crying can be a trigger.  Some medicines and food can trigger asthma.

## 2024-08-23 NOTE — PROGRESS NOTES
"Preventive Care Visit  [facility]  Abelardo Veliz NP,    Aug 23, 2024      Assessment & Plan       ICD-10-CM    1. Routine general medical examination at a health care facility  Z00.00 Comprehensive metabolic panel (BMP + Alb, Alk Phos, ALT, AST, Total. Bili, TP)     Comprehensive metabolic panel (BMP + Alb, Alk Phos, ALT, AST, Total. Bili, TP)      2. Mild intermittent asthma with exacerbation  J45.21 albuterol (PROAIR HFA/PROVENTIL HFA/VENTOLIN HFA) 108 (90 Base) MCG/ACT inhaler      3. Wheezing  R06.2       4. Mild persistent asthma without complication  J45.30 fluticasone (FLOVENT HFA) 110 MCG/ACT inhaler      5. Screening for prostate cancer  Z12.5 PSA, screen     PSA, screen      6. Lipid screening  Z13.220 Lipid panel     Lipid panel      7. Screening for diabetes mellitus  Z13.1 Hemoglobin A1c     Hemoglobin A1c        Update screening labs.  Reviewed healthy lifestyle behaviors.  Reviewed shots.  Declines today.  Some home BP higher.  Bring cuff in for nurse BP check to confirm accuracy.  Colon cancer screening up-to-date        BMI  Estimated body mass index is 29.38 kg/m  as calculated from the following:    Height as of this encounter: 1.759 m (5' 9.25\").    Weight as of this encounter: 90.9 kg (200 lb 6.4 oz).   Weight management plan: Discussed healthy diet and exercise guidelines    Counseling  Appropriate preventive services were addressed with this patient via screening, questionnaire, or discussion as appropriate for fall prevention, nutrition, physical activity, Tobacco-use cessation, social engagement, weight loss and cognition.  Checklist reviewing preventive services available has been given to the patient.  Reviewed patient's diet, addressing concerns and/or questions.   He is at risk for lack of exercise and has been provided with information to increase physical activity for the benefit of his well-being.       Ann Marie Yousif is a 53 year old, presenting for " the following:  Physical (Non fasting) and Hypertension (Has had elevated BP's recently)        8/23/2024    10:19 AM   Additional Questions   Roomed by Sue Pereira CMA        Health Care Directive  Patient does not have a Health Care Directive or Living Will: previously reviewed    HPI    Doing ok. Work has been a bit stressful lately and he's trying to figure out his plans going forward.          8/19/2024   General Health   How would you rate your overall physical health? (!) FAIR   Feel stress (tense, anxious, or unable to sleep) Very much      (!) STRESS CONCERN      8/19/2024   Nutrition   Three or more servings of calcium each day? (!) NO   Diet: Regular (no restrictions)   How many servings of fruit and vegetables per day? (!) 0-1   How many sweetened beverages each day? (!) 4+            8/19/2024   Exercise   Days per week of moderate/strenous exercise 1 day   Average minutes spent exercising at this level 20 min      (!) EXERCISE CONCERN      8/19/2024   Social Factors   Frequency of gathering with friends or relatives Once a week   Worry food won't last until get money to buy more No   Food not last or not have enough money for food? No   Do you have housing? (Housing is defined as stable permanent housing and does not include staying ouside in a car, in a tent, in an abandoned building, in an overnight shelter, or couch-surfing.) Yes   Are you worried about losing your housing? No   Lack of transportation? No   Unable to get utilities (heat,electricity)? No          8/19/2024   Fall Risk   Fallen 2 or more times in the past year? No   Trouble with walking or balance? No             8/19/2024   Dental   Dentist two times every year? Yes            8/19/2024   TB Screening   Were you born outside of the [place]? No        Today's PHQ-2 Score:       7/19/2024     7:04 AM   PHQ-2 ( 1999 [employer])   Q1: Little interest or pleasure in doing things 1   Q2: Feeling down, depressed or hopeless 1   PHQ-2 Score 2   Q1:  "Little interest or pleasure in doing things Several days   Q2: Feeling down, depressed or hopeless Several days   PHQ-2 Score 2         8/19/2024   Substance Use   Alcohol more than 3/day or more than 7/wk No   Do you use any other substances recreationally? No      Social History     Tobacco Use    Smoking status: Never     Passive exposure: Past    Smokeless tobacco: Never   Vaping Use    Vaping status: Never Used   Substance Use Topics    Alcohol use: Yes    Drug use: No         8/19/2024   STI Screening   New sexual partner(s) since last STI/HIV test? (!) DECLINE      Last PSA:   Prostate Specific Antigen Screen   Date Value Ref Range Status   11/17/2022 1.06 0.00 - 3.50 ng/mL Final   09/10/2020 1.6 0.0 - 3.5 ng/mL Final     ASCVD Risk   The 10-year ASCVD risk score (Yoli STRAUSS, et al., 2019) is: 6.1%    Values used to calculate the score:      Age: 53 years      Sex: Male      Is Non- : No      Diabetic: No      Tobacco smoker: No      Systolic Blood Pressure: 130 mmHg      Is BP treated: No      HDL Cholesterol: 44 mg/dL      Total Cholesterol: 191 mg/dL       Reviewed and updated as needed this visit by Provider                  No past medical history on file.  Past Surgical History:   Procedure Laterality Date    HERNIA REPAIR           Review of Systems  Constitutional, HEENT, cardiovascular, pulmonary, gi and gu systems are negative, except as otherwise noted.     Objective    Exam  /80 (BP Location: Left arm, Patient Position: Sitting, Cuff Size: Adult Regular)   Pulse 73   Temp 98.2  F (36.8  C) (Oral)   Resp 18   Ht 1.759 m (5' 9.25\")   Wt 90.9 kg (200 lb 6.4 oz)   SpO2 98%   BMI 29.38 kg/m     Estimated body mass index is 29.38 kg/m  as calculated from the following:    Height as of this encounter: 1.759 m (5' 9.25\").    Weight as of this encounter: 90.9 kg (200 lb 6.4 oz).    Physical Exam  GENERAL: alert and no distress  EYES: Eyes grossly normal to " inspection, PERRL and conjunctivae and sclerae normal  HENT: ear canals and TM's normal, nose and mouth without ulcers or lesions  NECK: no adenopathy, no asymmetry, masses, or scars  RESP: lungs clear to auscultation - no rales, rhonchi or wheezes  CV: regular rate and rhythm, normal S1 S2, no S3 or S4, no murmur, click or rub, no peripheral edema  ABDOMEN: soft, nontender, no hepatosplenomegaly, no masses and bowel sounds normal  MS: no gross musculoskeletal defects noted, no edema  SKIN: no suspicious lesions or rashes  NEURO: Normal strength and tone, mentation intact and speech normal  PSYCH: mentation appears normal, affect normal/bright        Signed Electronically by: Abelardo Veliz NP

## 2024-08-23 NOTE — PATIENT INSTRUCTIONS
"That shingles vaccine we talked about is called \"Shingrix\".     For patients on Medicare, this is covered at the pharmacy under Medicare part D starting in 2023.    For those with commercial insurance, check with your insurance to see if they cover it. If they do and you're interested in getting it, let me know and we can have you come in for just the shot without having to see me.    If interested in the pneumonia shot, let us know.    Consider scheduling the nurse blood pressure check and bring your home cuff in so we can make sure your home cuff is giving you accurate readings.    Refills of albuterol and flovent sent off.       Patient Education   Preventive Care Advice   This is general advice given by our system to help you stay healthy. However, your care team may have specific advice just for you. Please talk to your care team about your preventive care needs.  Nutrition  Eat 5 or more servings of fruits and vegetables each day.  Try wheat bread, brown rice and whole grain pasta (instead of white bread, rice, and pasta).  Get enough calcium and vitamin D. Check the label on foods and aim for 100% of the RDA (recommended daily allowance).  Lifestyle  Exercise at least 150 minutes each week  (30 minutes a day, 5 days a week).  Do muscle strengthening activities 2 days a week. These help control your weight and prevent disease.  No smoking.  Wear sunscreen to prevent skin cancer.  Have a dental exam and cleaning every 6 months.  Yearly exams  See your health care team every year to talk about:  Any changes in your health.  Any medicines your care team has prescribed.  Preventive care, family planning, and ways to prevent chronic diseases.  Shots (vaccines)   HPV shots (up to age 26), if you've never had them before.  Hepatitis B shots (up to age 59), if you've never had them before.  COVID-19 shot: Get this shot when it's due.  Flu shot: Get a flu shot every year.  Tetanus shot: Get a tetanus shot every 10 " years.  Pneumococcal, hepatitis A, and RSV shots: Ask your care team if you need these based on your risk.  Shingles shot (for age 50 and up)  General health tests  Diabetes screening:  Starting at age 35, Get screened for diabetes at least every 3 years.  If you are younger than age 35, ask your care team if you should be screened for diabetes.  Cholesterol test: At age 39, start having a cholesterol test every 5 years, or more often if advised.  Bone density scan (DEXA): At age 50, ask your care team if you should have this scan for osteoporosis (brittle bones).  Hepatitis C: Get tested at least once in your life.  STIs (sexually transmitted infections)  Before age 24: Ask your care team if you should be screened for STIs.  After age 24: Get screened for STIs if you're at risk. You are at risk for STIs (including HIV) if:  You are sexually active with more than one person.  You don't use condoms every time.  You or a partner was diagnosed with a sexually transmitted infection.  If you are at risk for HIV, ask about PrEP medicine to prevent HIV.  Get tested for HIV at least once in your life, whether you are at risk for HIV or not.  Cancer screening tests  Cervical cancer screening: If you have a cervix, begin getting regular cervical cancer screening tests starting at age 21.  Breast cancer scan (mammogram): If you've ever had breasts, begin having regular mammograms starting at age 40. This is a scan to check for breast cancer.  Colon cancer screening: It is important to start screening for colon cancer at age 45.  Have a colonoscopy test every 10 years (or more often if you're at risk) Or, ask your provider about stool tests like a FIT test every year or Cologuard test every 3 years.  To learn more about your testing options, visit:   .  For help making a decision, visit:   https://bit.ly/ey12289.  Prostate cancer screening test: If you have a prostate, ask your care team if a prostate cancer screening test  (PSA) at age 55 is right for you.  Lung cancer screening: If you are a current or former smoker ages 50 to 80, ask your care team if ongoing lung cancer screenings are right for you.  For informational purposes only. Not to replace the advice of your health care provider. Copyright   2023 [employer]. All rights reserved. Clinically reviewed by the [facility] Transitions Program. [employer] 514913 - REV 01/24.  Learning About Stress  What is stress?     Stress is your body's response to a hard situation. Your body can have a physical, emotional, or mental response. Stress is a fact of life for most people, and it affects everyone differently. What causes stress for you may not be stressful for someone else.  A lot of things can cause stress. You may feel stress when you go on a job interview, take a test, or run a race. This kind of short-term stress is normal and even useful. It can help you if you need to work hard or react quickly. For example, stress can help you finish an important job on time.  Long-term stress is caused by ongoing stressful situations or events. Examples of long-term stress include long-term health problems, ongoing problems at work, or conflicts in your family. Long-term stress can harm your health.  How does stress affect your health?  When you are stressed, your body responds as though you are in danger. It makes hormones that speed up your heart, make you breathe faster, and give you a burst of energy. This is called the fight-or-flight stress response. If the stress is over quickly, your body goes back to normal and no harm is done.  But if stress happens too often or lasts too long, it can have bad effects. Long-term stress can make you more likely to get sick, and it can make symptoms of some diseases worse. If you tense up when you are stressed, you may develop neck, shoulder, or low back pain. Stress is linked to high blood pressure and heart disease.  Stress also  harms your emotional health. It can make you bernstein, tense, or depressed. Your relationships may suffer, and you may not do well at work or school.  What can you do to manage stress?  You can try these things to help manage stress:   Do something active. Exercise or activity can help reduce stress. Walking is a great way to get started. Even everyday activities such as housecleaning or yard work can help.  Try yoga or arnol chi. These techniques combine exercise and meditation. You may need some training at first to learn them.  Do something you enjoy. For example, listen to music or go to a movie. Practice your hobby or do volunteer work.  Meditate. This can help you relax, because you are not worrying about what happened before or what may happen in the future.  Do guided imagery. Imagine yourself in any setting that helps you feel calm. You can use online videos, books, or a teacher to guide you.  Do breathing exercises. For example:  From a standing position, bend forward from the waist with your knees slightly bent. Let your arms dangle close to the floor.  Breathe in slowly and deeply as you return to a standing position. Roll up slowly and lift your head last.  Hold your breath for just a few seconds in the standing position.  Breathe out slowly and bend forward from the waist.  Let your feelings out. Talk, laugh, cry, and express anger when you need to. Talking with supportive friends or family, a counselor, or a nelson leader about your feelings is a healthy way to relieve stress. Avoid discussing your feelings with people who make you feel worse.  Write. It may help to write about things that are bothering you. This helps you find out how much stress you feel and what is causing it. When you know this, you can find better ways to cope.  What can you do to prevent stress?  You might try some of these things to help prevent stress:  Manage your time. This helps you find time to do the things you want and need to  "do.  Get enough sleep. Your body recovers from the stresses of the day while you are sleeping.  Get support. Your family, friends, and community can make a difference in how you experience stress.  Limit your news feed. Avoid or limit time on social media or news that may make you feel stressed.  Do something active. Exercise or activity can help reduce stress. Walking is a great way to get started.  Where can you learn more?  Go to https://www.[employer].net/patiented  Enter N032 in the search box to learn more about \"Learning About Stress.\"  Current as of: October 24, 2023               Content Version: 14.0    3284-4967 [employer].   Care instructions adapted under license by your healthcare professional. If you have questions about a medical condition or this instruction, always ask your healthcare professional. [employer] disclaims any warranty or liability for your use of this information.         "

## 2025-02-19 ENCOUNTER — OFFICE VISIT (OUTPATIENT)
Dept: FAMILY MEDICINE | Facility: CLINIC | Age: 55
End: 2025-02-19
Payer: COMMERCIAL

## 2025-02-19 VITALS
BODY MASS INDEX: 30.01 KG/M2 | SYSTOLIC BLOOD PRESSURE: 140 MMHG | DIASTOLIC BLOOD PRESSURE: 60 MMHG | TEMPERATURE: 98 F | RESPIRATION RATE: 16 BRPM | OXYGEN SATURATION: 95 % | HEIGHT: 68 IN | WEIGHT: 198 LBS | HEART RATE: 92 BPM

## 2025-02-19 DIAGNOSIS — J01.01 ACUTE RECURRENT MAXILLARY SINUSITIS: Primary | ICD-10-CM

## 2025-02-19 DIAGNOSIS — I49.9 REGULARLY IRREGULAR PULSE RHYTHYM: ICD-10-CM

## 2025-02-19 PROCEDURE — 99213 OFFICE O/P EST LOW 20 MIN: CPT

## 2025-02-19 RX ORDER — FLUTICASONE PROPIONATE 50 MCG
1 SPRAY, SUSPENSION (ML) NASAL DAILY
Qty: 16 G | Refills: 11 | Status: SHIPPED | OUTPATIENT
Start: 2025-02-19

## 2025-02-19 RX ORDER — DOXYCYCLINE 100 MG/1
100 CAPSULE ORAL 2 TIMES DAILY
Qty: 20 CAPSULE | Refills: 0 | Status: SHIPPED | OUTPATIENT
Start: 2025-02-19

## 2025-02-19 ASSESSMENT — ASTHMA QUESTIONNAIRES
QUESTION_3 LAST FOUR WEEKS HOW OFTEN DID YOUR ASTHMA SYMPTOMS (WHEEZING, COUGHING, SHORTNESS OF BREATH, CHEST TIGHTNESS OR PAIN) WAKE YOU UP AT NIGHT OR EARLIER THAN USUAL IN THE MORNING: NOT AT ALL
ACT_TOTALSCORE: 24
QUESTION_5 LAST FOUR WEEKS HOW WOULD YOU RATE YOUR ASTHMA CONTROL: WELL CONTROLLED
QUESTION_2 LAST FOUR WEEKS HOW OFTEN HAVE YOU HAD SHORTNESS OF BREATH: NOT AT ALL
ACT_TOTALSCORE: 24
QUESTION_1 LAST FOUR WEEKS HOW MUCH OF THE TIME DID YOUR ASTHMA KEEP YOU FROM GETTING AS MUCH DONE AT WORK, SCHOOL OR AT HOME: NONE OF THE TIME
QUESTION_4 LAST FOUR WEEKS HOW OFTEN HAVE YOU USED YOUR RESCUE INHALER OR NEBULIZER MEDICATION (SUCH AS ALBUTEROL): NOT AT ALL

## 2025-02-19 ASSESSMENT — ENCOUNTER SYMPTOMS: SINUS PRESSURE: 1

## 2025-02-19 NOTE — PROGRESS NOTES
"  Assessment & Plan     Acute recurrent maxillary sinusitis  Right maxillary sinus pressure, right ear pressure.  Mild bulging of TM on exam.  Patient reports that symptoms have been waxing and waning for approximately 2 months.  This is a seasonal sinus infection that is recurrent.  Usually resolved with antibiotics and fluticasone.  His medications are filled today and patient to follow-up if not improved  - doxycycline hyclate (VIBRAMYCIN) 100 MG capsule; Take 1 capsule (100 mg) by mouth 2 times daily.  - fluticasone (FLONASE) 50 MCG/ACT nasal spray; Spray 1 spray into both nostrils daily.    Regularly irregular pulse rhythym  Heart rate regularly irregular on auscultation.  Patient denies any history or knowledge of this.  Discussed EKG today versus follow-up with primary care provider.  Discussed atrial fibrillation versus PACs/PVCs.  Discussed risks of potential A-fib and red flags and when to seek immediate medical care. Elects to follow-up with primary care provider      BMI  Estimated body mass index is 29.89 kg/m  as calculated from the following:    Height as of this encounter: 1.734 m (5' 8.25\").    Weight as of this encounter: 89.8 kg (198 lb).             Subjective   Benja is a 54 year old, presenting for the following health issues:  Sinus Problem (Sinus congestion and pressure x2 months. Right ear plugged. Using Sudafed)        2/19/2025     8:23 AM   Additional Questions   Roomed by alisa longoria   Accompanied by self     History of Present Illness       Reason for visit:  Sinus infection  Symptom onset:  More than a month  Symptom intensity:  Severe  Symptom progression:  Staying the same  Had these symptoms before:  Yes  Has tried/received treatment for these symptoms:  No   He is taking medications regularly.                     Objective    BP (!) 148/78 (BP Location: Right arm, Patient Position: Sitting)   Pulse 92   Temp 98  F (36.7  C) (Tympanic)   Resp 16   Ht 1.734 m (5' 8.25\")   Wt 89.8 " kg (198 lb)   SpO2 95%   BMI 29.89 kg/m    Body mass index is 29.89 kg/m .  Physical Exam  HENT:      Head: Normocephalic.      Nose:      Right Sinus: Maxillary sinus tenderness present.      Mouth/Throat:      Mouth: Mucous membranes are moist.   Eyes:      Extraocular Movements: Extraocular movements intact.      Conjunctiva/sclera: Conjunctivae normal.   Cardiovascular:      Rate and Rhythm: Normal rate. Rhythm irregular.   Pulmonary:      Effort: Pulmonary effort is normal.   Skin:     General: Skin is warm and dry.      Capillary Refill: Capillary refill takes less than 2 seconds.   Neurological:      Mental Status: He is alert and oriented to person, place, and time.   Psychiatric:         Behavior: Behavior normal.         Thought Content: Thought content normal.                    Signed Electronically by: CHERYL Goyal CNP

## 2025-07-24 ENCOUNTER — PATIENT OUTREACH (OUTPATIENT)
Dept: CARE COORDINATION | Facility: CLINIC | Age: 55
End: 2025-07-24
Payer: COMMERCIAL

## 2025-08-07 ENCOUNTER — PATIENT OUTREACH (OUTPATIENT)
Dept: CARE COORDINATION | Facility: CLINIC | Age: 55
End: 2025-08-07
Payer: COMMERCIAL